# Patient Record
Sex: MALE | Race: ASIAN | NOT HISPANIC OR LATINO
[De-identification: names, ages, dates, MRNs, and addresses within clinical notes are randomized per-mention and may not be internally consistent; named-entity substitution may affect disease eponyms.]

---

## 2018-06-07 ENCOUNTER — MEDICATION RENEWAL (OUTPATIENT)
Age: 68
End: 2018-06-07

## 2018-06-07 PROBLEM — Z00.00 ENCOUNTER FOR PREVENTIVE HEALTH EXAMINATION: Status: ACTIVE | Noted: 2018-06-07

## 2018-11-21 ENCOUNTER — RX RENEWAL (OUTPATIENT)
Age: 68
End: 2018-11-21

## 2018-12-14 ENCOUNTER — RX RENEWAL (OUTPATIENT)
Age: 68
End: 2018-12-14

## 2019-05-10 ENCOUNTER — APPOINTMENT (OUTPATIENT)
Dept: PAIN MANAGEMENT | Facility: CLINIC | Age: 69
End: 2019-05-10
Payer: MEDICARE

## 2019-05-10 VITALS
DIASTOLIC BLOOD PRESSURE: 80 MMHG | BODY MASS INDEX: 22.43 KG/M2 | SYSTOLIC BLOOD PRESSURE: 162 MMHG | HEIGHT: 68 IN | WEIGHT: 148 LBS

## 2019-05-10 DIAGNOSIS — G47.00 INSOMNIA, UNSPECIFIED: ICD-10-CM

## 2019-05-10 PROCEDURE — 99204 OFFICE O/P NEW MOD 45 MIN: CPT

## 2019-05-10 NOTE — ASSESSMENT
[FreeTextEntry1] : back and leg pain likely secondary to lumbar radiculopathy and discogenic pain vs neuropathic pain secondary to postherpetic neuralgia refractory to conservative treatments, will obtain MRI LS to evaluate for pathology\par \par may consider PT vs intervention pending eval\par \par may consider epidural steroid injection vs lumbar sympathetic nerve block for neuropathic pain\par \par \par The above diagnosis and treatment plan is medically reasonable and necessary based on the patient encounter.\par \par There were no barriers to communication.\par Informed patient that I would be available for any additional questions.\par Patient was instructed to call with any worsening symptoms including severe pain, new numbness/weakness, or changes in the bowel/bladder function. \par \par Instructed patient to maintain pain diary to monitor pain level, mobility, and function.\par \par The referring provider was informed of the above diagnosis and treatment plan.\par

## 2019-05-10 NOTE — HISTORY OF PRESENT ILLNESS
[4] : a current pain level of 4/10 [Paroxysmal] : paroxysmal [___ yrs] : [unfilled] year(s) ago [5] : an average pain level of 5/10 [8] : a maximum pain level of 8/10 [6] : a minimum pain level of 6/10 [Burning] : burning [FreeTextEntry1] : HPI\par \par Mr. JUSTYN MORALES is a 68 year M otherwise pmhx of shingles in right right foot 2 years ago complains of right foot and right lateral leg pain.  \par \par \par Previous and current pain medications/doses/effects:\par \par gabapentin 600mg TID without improvement\par Many topical medications\par \par Previous Pain Treatments:\par \par n.a\par \par Previous Pain Injections:\par \par n/a\par \par Previous Diagnostic Studies/Images:\par \par n/a\par \par \par \par  [FreeTextEntry7] : right posterolateral thigh and shin [de-identified] : numbness, pins/needles [FreeTextEntry4] : n/a [FreeTextEntry3] : n/a

## 2019-05-10 NOTE — PHYSICAL EXAM
[UE/LE] : Motor: 5/5 motor strength in bilateral upper & lower extremities [ALL] : dorsalis pedis, posterior tibial, femoral, popliteal, and radial 2+ and symmetric bilaterally [Cranial Nerves Oculomotor (III)] : extraocular motion intact [Cranial Nerves Trigeminal (V)] : facial sensation intact symmetrically [Cranial Nerves Facial (VII)] : face symmetrical [Cranial Nerves Optic (II)] : visual acuity intact bilaterally,  visual fields full to confrontation, pupils equal round and reactive to light [Cranial Nerves Accessory (XI - Cranial And Spinal)] : head turning and shoulder shrug symmetric [Cranial Nerves Glossopharyngeal (IX)] : tongue and palate midline [Cranial Nerves Vestibulocochlear (VIII)] : hearing was intact bilaterally [Cranial Nerves Hypoglossal (XII)] : there was no tongue deviation with protrusion [Within functional limits and without pain] : within functional limits and without pain [Normal] : Gait: normal [de-identified] : discoloration over right ventral feet

## 2019-05-10 NOTE — REASON FOR VISIT
[Initial Consultation] : an initial pain management consultation [FreeTextEntry2] : referred by Anayeli for evaluation of back pain

## 2019-06-07 ENCOUNTER — RESULT REVIEW (OUTPATIENT)
Age: 69
End: 2019-06-07

## 2019-06-10 ENCOUNTER — RECORD ABSTRACTING (OUTPATIENT)
Age: 69
End: 2019-06-10

## 2019-06-10 DIAGNOSIS — Z80.0 FAMILY HISTORY OF MALIGNANT NEOPLASM OF DIGESTIVE ORGANS: ICD-10-CM

## 2019-06-17 ENCOUNTER — RX RENEWAL (OUTPATIENT)
Age: 69
End: 2019-06-17

## 2019-06-18 ENCOUNTER — APPOINTMENT (OUTPATIENT)
Dept: PAIN MANAGEMENT | Facility: CLINIC | Age: 69
End: 2019-06-18
Payer: MEDICARE

## 2019-06-18 VITALS
WEIGHT: 146 LBS | HEIGHT: 68 IN | DIASTOLIC BLOOD PRESSURE: 78 MMHG | SYSTOLIC BLOOD PRESSURE: 146 MMHG | BODY MASS INDEX: 22.13 KG/M2

## 2019-06-18 PROCEDURE — 99214 OFFICE O/P EST MOD 30 MIN: CPT

## 2019-06-18 NOTE — HISTORY OF PRESENT ILLNESS
[___ yrs] : [unfilled] year(s) ago [Paroxysmal] : paroxysmal [4] : a current pain level of 4/10 [5] : an average pain level of 5/10 [6] : a minimum pain level of 6/10 [8] : a maximum pain level of 8/10 [Burning] : burning [FreeTextEntry1] : Interval Note:\par \par Since last visit the pain is not improved. Continues to have significant pain over bottom of right foot and leg.  States that his skin feels burning.  Unable to ambulate.  Denies any additional weakness, numbness, bowel/bladder dysfunction.  Pain intensity is 6/10\par \par \par HPI\par \par Mr. JUSTYN MORALES is a 68 year M otherwise pmhx of shingles in right right foot 2 years ago complains of right foot and right lateral leg pain.  \par \par \par Previous and current pain medications/doses/effects:\par \par gabapentin 600mg TID without improvement\par \par Many topical medications\par \par Previous Pain Treatments:\par \par n.a\par \par Previous Pain Injections:\par \par \par Previous Diagnostic Studies/Images:\par \par MRI LS 6/2019\par \par L5-S1: Mild facet osteoarthritis. Mild disc thinning. Mild disc bulging. Minimal degenerative\par retrolisthesis. Superimposed on disc bulging is a small central/right disc herniation (image 3\par series 11) with compression of the right S1 in the subarticular zone. The thecal sac is markedly\par narrowed predominantly due to epidural lipomatosis. Mild bilateral foramen stenosis.\par \par      L4-L5: Mild facet osteoarthritis. Thickening of the ligamentum flavum. Mild disc bulge.\par Moderate central spinal stenosis predominantly due to epidural lipomatosis. Mild foramen stenosis.\par \par      L3-L4: Mild facet osteoarthritis. Mild disc bulge. Mild central spinal stenosis predominantly\par due to epidural lipomatosis.\par \par      L2-L3: Minimal disc bulge.\par \par      L1-L2: Mild facet osteoarthritis. No bulge or herniation.\par \par      T12-L1: No bulge or herniation. Right facet osteoarthritis results in slight compression of the\par dorsal/right aspect of the thecal sac.\par \par      T11-T12: No bulge or herniation.\par \par  INTRADURAL SPACE AND CONUS MEDULLARIS: No lesion demonstrated.\par \par  BONE MARROW SIGNAL: No neoplastic replacement of bone marrow.\par \par  ALIGNMENT: Straightening of the lumbar lordosis consistent with multilevel degenerative changes,\par positioning and/or muscle spasm.\par \par  PARASPINAL SOFT TISSUES: No paraspinal mass. There is a small right renal lesion that is not fully\par characterized on lumbar MRI, but is consistent with a cyst.\par \par \par \par  IMPRESSION: Multilevel disc bulging and facet osteoarthritis. Epidural lipomatosis.\par \par  Small central/right disc herniation L5-S1 compression of the right S1 nerve root in the\par subarticular zone. Markedly narrowed thecal sac L5-S1 predominantly due to epidural lipomatosis.\par \par  Moderate central spinal stenosis L4-L5 predominantly due to epidural lipomatosis.\par \par  Mild central spinal stenosis L3-L4 predominantly due to epidural lipomatosis.\par \par \par \par \par  [FreeTextEntry7] : right posterolateral thigh and shin [de-identified] : numbness, pins/needles [FreeTextEntry3] : n/a [FreeTextEntry4] : n/a

## 2019-06-18 NOTE — PHYSICAL EXAM
[Within functional limits and without pain] : within functional limits and without pain [UE/LE] : Sensory: Intact in bilateral upper & lower extremities [ALL] : dorsalis pedis, posterior tibial, femoral, popliteal, and radial 2+ and symmetric bilaterally [Cranial Nerves Optic (II)] : visual acuity intact bilaterally,  visual fields full to confrontation, pupils equal round and reactive to light [Cranial Nerves Oculomotor (III)] : extraocular motion intact [Cranial Nerves Trigeminal (V)] : facial sensation intact symmetrically [Cranial Nerves Facial (VII)] : face symmetrical [Cranial Nerves Vestibulocochlear (VIII)] : hearing was intact bilaterally [Cranial Nerves Glossopharyngeal (IX)] : tongue and palate midline [Cranial Nerves Accessory (XI - Cranial And Spinal)] : head turning and shoulder shrug symmetric [Cranial Nerves Hypoglossal (XII)] : there was no tongue deviation with protrusion [Normal] : Normal affect [de-identified] : discoloration over right ventral feet

## 2019-06-18 NOTE — ASSESSMENT
[FreeTextEntry1] : I personally reviewed the relevant imaging.  Discussed and explained to patient the likely source of pathology and pain.  Questions answered.\par \par Lumbar radiculopathy secondary to disc herniation and facet arthropathy demonstrated on MRI LS, refractory to conservative treatments, will schedule RIGHT L5-S1 and S1 transforaminal epidural LOCAL ANESTHETIC injection r/b/a discussed (will avoid steroids currently given increased epidural fat)\par \par trial gabapentin uptitrate to TID\par cautioned change in mood.  Encouraged to call with any worsening mood or depression/suicidal ideations\par \par may consider lumbar sympathetic nerve block for neuropathic pain\par \par Referral for acupuncture treatment\par \par The above diagnosis and treatment plan is medically reasonable and necessary based on the patient encounter.\par \par There were no barriers to communication.\par Informed patient that I would be available for any additional questions.\par Patient was instructed to call with any worsening symptoms including severe pain, new numbness/weakness, or changes in the bowel/bladder function. \par \par Instructed patient to maintain pain diary to monitor pain level, mobility, and function.\par \par

## 2019-07-02 ENCOUNTER — APPOINTMENT (OUTPATIENT)
Dept: GASTROENTEROLOGY | Facility: CLINIC | Age: 69
End: 2019-07-02
Payer: MEDICARE

## 2019-07-02 VITALS
BODY MASS INDEX: 21.98 KG/M2 | SYSTOLIC BLOOD PRESSURE: 172 MMHG | DIASTOLIC BLOOD PRESSURE: 88 MMHG | HEIGHT: 68 IN | HEART RATE: 71 BPM | WEIGHT: 145 LBS

## 2019-07-02 DIAGNOSIS — R91.1 SOLITARY PULMONARY NODULE: ICD-10-CM

## 2019-07-02 PROCEDURE — 99215 OFFICE O/P EST HI 40 MIN: CPT

## 2019-07-02 RX ORDER — ASPIRIN ENTERIC COATED TABLETS 81 MG 81 MG/1
81 TABLET, DELAYED RELEASE ORAL
Refills: 0 | Status: DISCONTINUED | COMMUNITY
End: 2019-07-02

## 2019-07-02 NOTE — HISTORY OF PRESENT ILLNESS
[FreeTextEntry1] : 1.  intermittent diarrhea,\par may go to br three or four times, most if not all days.\par \par can no nocturnal bowel movement\par most ly in the morning, after breakfast...\par loose, urgency..needs to go quickly, hard to hold back.\par \par later in the day, it is improved.\par \par no blood in stool\par \par hx of having colonoscopy, dec 2016..and at the time, he had\par some edematous change of the interhaustral folds of the ascending colon\par \par and on egd;\par \par about eight or ten ulcerations in the descending duodenum.\par \par the colonosc was performed into the terminal ileum \par \par bx of colon unrema\par but marked inflamm on duod bx\par \par not using aspirin, had been using enteric coated aspirin, which i felt was the cause of his problem\par \par weight very stable, not losing weight, appetite is good\par \par has seen Dr William for H zoster, and has post herpetic neuralgia in the leg, saw dr Noe, and dr Noe  began him on Gabapentin tid, \par \par had MRI and Pet scan to further evaluate...about two years ago, when evaluating patient I believe for elevation of his CEA.\par \par everything was reported to the patient has been negative.\par \par no use of scotch, perhaps 8-10 oz of wine in a day..\par \par no mucous in bowel movements..\par \par no abdominal pain or cramps....\par \par review of the previous records, inc in addition to work up in dec 2016\par \par upper gi bleed , late 2017, hosp, received seven units of packed cells , icu care, egd showed very large duodenal ulcer, treated endoscopically and then with embiollization of gastro duodenal artery\par patient on pantop since that time\par \par also, pulmonary pathology noted on most recent ct scan, and subsequently PET scan for further eval of abnormall...CAT scan\par \par he is now having stable weight\par \par

## 2019-07-02 NOTE — PHYSICAL EXAM
[Abdomen Soft] : soft [FreeTextEntry1] : liver down three to four finger breaths below right costal margin [Internal Hemorrhoid] : no internal hemorrhoids [External Hemorrhoid] : no external hemorrhoids [Occult Blood Positive] : stool was negative for occult blood

## 2019-07-02 NOTE — ASSESSMENT
[FreeTextEntry1] : 1.  patient is having abnormal bowel function, loose bowel movements\par 2.  on exam, he also has hepatomegaly\par 3.  ultrasound in march; inc echogenicity\par 4.  i am getting some lab work up, and also, I am urging the patient to see his PCP..he has seen dr Corona, but will be going back to Dr Ramsey\par 5.  in the meantime, kindra do chest and abdominal ct scan\par \par patient needs full eval of abdomen and chest\par o allergy to iodine\par no aller gy to xray contrast\par no asthma\par \par More than 50% of the face to face time was devoted to counseling and /or coordination of care.  THis coordination of care may have included reviewing other medical notes and reports, and communicating with other health professionals\par \par no allergies to meds\par so we will do with iv contrast if his renal function is ok

## 2019-07-04 ENCOUNTER — RX RENEWAL (OUTPATIENT)
Age: 69
End: 2019-07-04

## 2019-07-04 LAB
ALBUMIN SERPL ELPH-MCNC: 4.6 G/DL
ALP BLD-CCNC: 119 U/L
ALT SERPL-CCNC: 33 U/L
ANION GAP SERPL CALC-SCNC: 15 MMOL/L
AST SERPL-CCNC: 88 U/L
BASOPHILS # BLD AUTO: 0.04 K/UL
BASOPHILS NFR BLD AUTO: 1 %
BILIRUB SERPL-MCNC: 1.4 MG/DL
BUN SERPL-MCNC: 15 MG/DL
CALCIUM SERPL-MCNC: 10.1 MG/DL
CEA SERPL-MCNC: 8.9 NG/ML
CHLORIDE SERPL-SCNC: 99 MMOL/L
CO2 SERPL-SCNC: 27 MMOL/L
CREAT SERPL-MCNC: 1.25 MG/DL
CRP SERPL-MCNC: 0.11 MG/DL
EOSINOPHIL # BLD AUTO: 0.13 K/UL
EOSINOPHIL NFR BLD AUTO: 3.1 %
ERYTHROCYTE [SEDIMENTATION RATE] IN BLOOD BY WESTERGREN METHOD: 50 MM/HR
GLUCOSE SERPL-MCNC: 113 MG/DL
HCT VFR BLD CALC: 41.1 %
HGB BLD-MCNC: 13.1 G/DL
IMM GRANULOCYTES NFR BLD AUTO: 0.2 %
LYMPHOCYTES # BLD AUTO: 1.12 K/UL
LYMPHOCYTES NFR BLD AUTO: 26.8 %
MAN DIFF?: NORMAL
MCHC RBC-ENTMCNC: 31.9 GM/DL
MCHC RBC-ENTMCNC: 34.6 PG
MCV RBC AUTO: 108.4 FL
MONOCYTES # BLD AUTO: 0.6 K/UL
MONOCYTES NFR BLD AUTO: 14.4 %
NEUTROPHILS # BLD AUTO: 2.28 K/UL
NEUTROPHILS NFR BLD AUTO: 54.5 %
PLATELET # BLD AUTO: 157 K/UL
POTASSIUM SERPL-SCNC: 5 MMOL/L
PROT SERPL-MCNC: 8.3 G/DL
RBC # BLD: 3.79 M/UL
RBC # FLD: 15.4 %
SODIUM SERPL-SCNC: 141 MMOL/L
WBC # FLD AUTO: 4.18 K/UL

## 2019-07-06 ENCOUNTER — RX RENEWAL (OUTPATIENT)
Age: 69
End: 2019-07-06

## 2019-07-11 ENCOUNTER — APPOINTMENT (OUTPATIENT)
Dept: PAIN MANAGEMENT | Facility: HOSPITAL | Age: 69
End: 2019-07-11

## 2019-08-12 ENCOUNTER — RX RENEWAL (OUTPATIENT)
Age: 69
End: 2019-08-12

## 2020-06-12 ENCOUNTER — APPOINTMENT (OUTPATIENT)
Dept: INTERNAL MEDICINE | Facility: CLINIC | Age: 70
End: 2020-06-12
Payer: MEDICARE

## 2020-06-12 VITALS
BODY MASS INDEX: 23.49 KG/M2 | WEIGHT: 155 LBS | DIASTOLIC BLOOD PRESSURE: 80 MMHG | SYSTOLIC BLOOD PRESSURE: 160 MMHG | HEIGHT: 68 IN | HEART RATE: 97 BPM | OXYGEN SATURATION: 98 %

## 2020-06-12 DIAGNOSIS — M10.9 GOUT, UNSPECIFIED: ICD-10-CM

## 2020-06-12 PROCEDURE — 36415 COLL VENOUS BLD VENIPUNCTURE: CPT

## 2020-06-12 PROCEDURE — 99215 OFFICE O/P EST HI 40 MIN: CPT | Mod: 25

## 2020-06-12 NOTE — HISTORY OF PRESENT ILLNESS
[FreeTextEntry1] : Evaluation for swollen ankles [de-identified] : This is a 69-year-old male that I formally took care of. He has a long-standing history of alcohol abuse and smoking history. He continues to drink about 12 ounces of wine per day and smokes less than one pack per day. He is known to have marked hepatomegaly. He states he had shingles about 3 years ago on the right leg. Since that time he complains of chest pain and numbness in the bottom of his right foot. Recently he also has some pain along the bottom of his left foot. Last year he was in [place] and was told of elevated uric acid levels. He now complains of marked swelling of the ankles right greater than left for the past one to 2 weeks. There is no chest pain no shortness of breath. There is occasional dizziness. His medications include losartan 100, Lipitor 10, Avapro Zoloft 40, vitamins and iron. Current blood pressure is 140/60.

## 2020-06-12 NOTE — PHYSICAL EXAM
[No Acute Distress] : no acute distress [Well Nourished] : well nourished [Well Developed] : well developed [Well-Appearing] : well-appearing [Normal Sclera/Conjunctiva] : normal sclera/conjunctiva [EOMI] : extraocular movements intact [PERRL] : pupils equal round and reactive to light [Normal Oropharynx] : the oropharynx was normal [Normal Outer Ear/Nose] : the outer ears and nose were normal in appearance [No JVD] : no jugular venous distention [No Lymphadenopathy] : no lymphadenopathy [Supple] : supple [Thyroid Normal, No Nodules] : the thyroid was normal and there were no nodules present [No Respiratory Distress] : no respiratory distress  [No Accessory Muscle Use] : no accessory muscle use [Clear to Auscultation] : lungs were clear to auscultation bilaterally [Normal Rate] : normal rate  [Regular Rhythm] : with a regular rhythm [No Murmur] : no murmur heard [Normal S1, S2] : normal S1 and S2 [No Abdominal Bruit] : a ~M bruit was not heard ~T in the abdomen [No Carotid Bruits] : no carotid bruits [No Varicosities] : no varicosities [Pedal Pulses Present] : the pedal pulses are present [No Palpable Aorta] : no palpable aorta [No Extremity Clubbing/Cyanosis] : no extremity clubbing/cyanosis [Soft] : abdomen soft [Non Tender] : non-tender [No Masses] : no abdominal mass palpated [Non-distended] : non-distended [Normal Bowel Sounds] : normal bowel sounds [Normal Posterior Cervical Nodes] : no posterior cervical lymphadenopathy [Normal Anterior Cervical Nodes] : no anterior cervical lymphadenopathy [No CVA Tenderness] : no CVA  tenderness [No Joint Swelling] : no joint swelling [No Spinal Tenderness] : no spinal tenderness [Grossly Normal Strength/Tone] : grossly normal strength/tone [No Rash] : no rash [Coordination Grossly Intact] : coordination grossly intact [Normal Gait] : normal gait [No Focal Deficits] : no focal deficits [Normal Insight/Judgement] : insight and judgment were intact [Normal Affect] : the affect was normal [Deep Tendon Reflexes (DTR)] : deep tendon reflexes were 2+ and symmetric [de-identified] : 3+ ankle edema r >l  [de-identified] : liver 4 fb below rcm

## 2020-06-12 NOTE — ASSESSMENT
[FreeTextEntry1] : Patient with marked ankle edema bilaterally. I will start Lasix 20 mg daily. We'll obtain routine labs including uric acid level although I do not believe we are dealing with gout.. Patient clearly has chronic liver disease. He is advised that he must stop drinking. 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 Patient is advised to see a neurologist.

## 2020-06-17 LAB
25(OH)D3 SERPL-MCNC: 51.9 NG/ML
ALBUMIN SERPL ELPH-MCNC: 4.2 G/DL
ALP BLD-CCNC: 138 U/L
ALT SERPL-CCNC: 31 U/L
ANION GAP SERPL CALC-SCNC: 18 MMOL/L
AST SERPL-CCNC: 98 U/L
BASOPHILS # BLD AUTO: 0.04 K/UL
BASOPHILS NFR BLD AUTO: 0.7 %
BILIRUB SERPL-MCNC: 1.5 MG/DL
BUN SERPL-MCNC: 12 MG/DL
CALCIUM SERPL-MCNC: 9.8 MG/DL
CHLORIDE SERPL-SCNC: 98 MMOL/L
CHOLEST SERPL-MCNC: 146 MG/DL
CHOLEST/HDLC SERPL: 4.5 RATIO
CO2 SERPL-SCNC: 22 MMOL/L
CREAT SERPL-MCNC: 1.16 MG/DL
EOSINOPHIL # BLD AUTO: 0.09 K/UL
EOSINOPHIL NFR BLD AUTO: 1.6 %
ESTIMATED AVERAGE GLUCOSE: 108 MG/DL
GLUCOSE SERPL-MCNC: 149 MG/DL
HBA1C MFR BLD HPLC: 5.4 %
HCT VFR BLD CALC: 39 %
HDLC SERPL-MCNC: 32 MG/DL
HGB BLD-MCNC: 12.4 G/DL
IMM GRANULOCYTES NFR BLD AUTO: 0.2 %
LDLC SERPL CALC-MCNC: 58 MG/DL
LYMPHOCYTES # BLD AUTO: 1.45 K/UL
LYMPHOCYTES NFR BLD AUTO: 26.5 %
MAN DIFF?: NORMAL
MCHC RBC-ENTMCNC: 31.6 PG
MCHC RBC-ENTMCNC: 31.8 GM/DL
MCV RBC AUTO: 99.5 FL
MONOCYTES # BLD AUTO: 0.69 K/UL
MONOCYTES NFR BLD AUTO: 12.6 %
NEUTROPHILS # BLD AUTO: 3.19 K/UL
NEUTROPHILS NFR BLD AUTO: 58.4 %
PLATELET # BLD AUTO: 148 K/UL
POTASSIUM SERPL-SCNC: 4 MMOL/L
PROT SERPL-MCNC: 8 G/DL
PSA SERPL-MCNC: 0.57 NG/ML
RBC # BLD: 3.92 M/UL
RBC # FLD: 16.5 %
SODIUM SERPL-SCNC: 138 MMOL/L
TRIGL SERPL-MCNC: 279 MG/DL
TSH SERPL-ACNC: 2.71 UIU/ML
URATE SERPL-MCNC: 7.4 MG/DL
WBC # FLD AUTO: 5.47 K/UL

## 2020-08-10 ENCOUNTER — RX RENEWAL (OUTPATIENT)
Age: 70
End: 2020-08-10

## 2020-11-22 DIAGNOSIS — R01.1 CARDIAC MURMUR, UNSPECIFIED: ICD-10-CM

## 2020-12-15 ENCOUNTER — NON-APPOINTMENT (OUTPATIENT)
Age: 70
End: 2020-12-15

## 2020-12-15 ENCOUNTER — APPOINTMENT (OUTPATIENT)
Dept: CARDIOLOGY | Facility: CLINIC | Age: 70
End: 2020-12-15
Payer: MEDICARE

## 2020-12-15 VITALS
DIASTOLIC BLOOD PRESSURE: 70 MMHG | WEIGHT: 155 LBS | HEIGHT: 68 IN | HEART RATE: 100 BPM | BODY MASS INDEX: 23.49 KG/M2 | SYSTOLIC BLOOD PRESSURE: 130 MMHG

## 2020-12-15 PROBLEM — R01.1 MURMUR: Status: ACTIVE | Noted: 2020-12-14

## 2020-12-15 PROCEDURE — 99205 OFFICE O/P NEW HI 60 MIN: CPT

## 2020-12-15 PROCEDURE — 0296T: CPT

## 2020-12-15 PROCEDURE — 99072 ADDL SUPL MATRL&STAF TM PHE: CPT

## 2020-12-15 PROCEDURE — 93000 ELECTROCARDIOGRAM COMPLETE: CPT | Mod: 59

## 2020-12-15 RX ORDER — FUROSEMIDE 20 MG/1
20 TABLET ORAL DAILY
Qty: 90 | Refills: 3 | Status: DISCONTINUED | COMMUNITY
Start: 2020-06-17 | End: 2020-12-15

## 2020-12-15 RX ORDER — PANTOPRAZOLE 40 MG/1
40 TABLET, DELAYED RELEASE ORAL DAILY
Qty: 90 | Refills: 3 | Status: DISCONTINUED | COMMUNITY
Start: 2019-07-06 | End: 2020-12-15

## 2020-12-15 RX ORDER — VALACYCLOVIR 1 G/1
1 TABLET, FILM COATED ORAL
Refills: 0 | Status: DISCONTINUED | COMMUNITY
End: 2020-12-15

## 2020-12-15 RX ORDER — ATORVASTATIN CALCIUM 10 MG/1
10 TABLET, FILM COATED ORAL DAILY
Qty: 90 | Refills: 3 | Status: DISCONTINUED | COMMUNITY
Start: 2019-06-17 | End: 2020-12-15

## 2020-12-15 RX ORDER — ZOLPIDEM TARTRATE 5 MG/1
5 TABLET, FILM COATED ORAL
Qty: 30 | Refills: 0 | Status: DISCONTINUED | COMMUNITY
End: 2020-12-15

## 2020-12-15 RX ORDER — PANTOPRAZOLE 40 MG/1
40 TABLET, DELAYED RELEASE ORAL DAILY
Qty: 90 | Refills: 3 | Status: DISCONTINUED | COMMUNITY
Start: 2019-07-04 | End: 2020-12-15

## 2020-12-15 RX ORDER — PANTOPRAZOLE 40 MG/1
40 TABLET, DELAYED RELEASE ORAL
Qty: 90 | Refills: 3 | Status: DISCONTINUED | COMMUNITY
Start: 2018-12-14 | End: 2020-12-15

## 2020-12-15 RX ORDER — GABAPENTIN 300 MG/1
300 CAPSULE ORAL
Qty: 90 | Refills: 1 | Status: DISCONTINUED | COMMUNITY
Start: 2019-06-18 | End: 2020-12-15

## 2020-12-21 PROCEDURE — 99072 ADDL SUPL MATRL&STAF TM PHE: CPT

## 2020-12-21 PROCEDURE — 0298T: CPT

## 2021-01-12 ENCOUNTER — RESULT REVIEW (OUTPATIENT)
Age: 71
End: 2021-01-12

## 2021-01-26 ENCOUNTER — APPOINTMENT (OUTPATIENT)
Dept: CARDIOLOGY | Facility: CLINIC | Age: 71
End: 2021-01-26
Payer: MEDICARE

## 2021-01-26 VITALS
WEIGHT: 169 LBS | TEMPERATURE: 97.8 F | BODY MASS INDEX: 25.61 KG/M2 | DIASTOLIC BLOOD PRESSURE: 50 MMHG | HEART RATE: 76 BPM | SYSTOLIC BLOOD PRESSURE: 110 MMHG | HEIGHT: 68 IN

## 2021-01-26 DIAGNOSIS — R00.2 PALPITATIONS: ICD-10-CM

## 2021-01-26 DIAGNOSIS — I47.1 SUPRAVENTRICULAR TACHYCARDIA: ICD-10-CM

## 2021-01-26 PROCEDURE — 99072 ADDL SUPL MATRL&STAF TM PHE: CPT

## 2021-01-26 PROCEDURE — 99215 OFFICE O/P EST HI 40 MIN: CPT

## 2021-01-28 ENCOUNTER — RESULT REVIEW (OUTPATIENT)
Age: 71
End: 2021-01-28

## 2021-01-29 ENCOUNTER — APPOINTMENT (OUTPATIENT)
Dept: GASTROENTEROLOGY | Facility: HOSPITAL | Age: 71
End: 2021-01-29

## 2021-01-29 ENCOUNTER — RESULT REVIEW (OUTPATIENT)
Age: 71
End: 2021-01-29

## 2021-02-02 ENCOUNTER — APPOINTMENT (OUTPATIENT)
Dept: GASTROENTEROLOGY | Facility: HOSPITAL | Age: 71
End: 2021-02-02

## 2021-02-06 ENCOUNTER — RESULT REVIEW (OUTPATIENT)
Age: 71
End: 2021-02-06

## 2021-02-18 ENCOUNTER — RX CHANGE (OUTPATIENT)
Age: 71
End: 2021-02-18

## 2021-03-30 ENCOUNTER — APPOINTMENT (OUTPATIENT)
Dept: GASTROENTEROLOGY | Facility: CLINIC | Age: 71
End: 2021-03-30
Payer: MEDICARE

## 2021-03-30 VITALS
TEMPERATURE: 97.6 F | WEIGHT: 140 LBS | HEART RATE: 65 BPM | DIASTOLIC BLOOD PRESSURE: 76 MMHG | BODY MASS INDEX: 21.22 KG/M2 | SYSTOLIC BLOOD PRESSURE: 122 MMHG | HEIGHT: 68 IN

## 2021-03-30 VITALS
TEMPERATURE: 99 F | WEIGHT: 140 LBS | DIASTOLIC BLOOD PRESSURE: 76 MMHG | HEART RATE: 65 BPM | HEIGHT: 68 IN | BODY MASS INDEX: 21.22 KG/M2 | SYSTOLIC BLOOD PRESSURE: 128 MMHG

## 2021-03-30 DIAGNOSIS — R97.0 ELEVATED CARCINOEMBRYONIC ANTIGEN [CEA]: ICD-10-CM

## 2021-03-30 PROCEDURE — 99214 OFFICE O/P EST MOD 30 MIN: CPT

## 2021-03-30 PROCEDURE — 99072 ADDL SUPL MATRL&STAF TM PHE: CPT

## 2021-03-30 RX ORDER — MULTIVITAMIN WITH FOLIC ACID 400 MCG
TABLET ORAL
Qty: 30 | Refills: 0 | Status: DISCONTINUED | COMMUNITY
Start: 2021-02-13

## 2021-03-30 RX ORDER — FUROSEMIDE 40 MG/1
40 TABLET ORAL
Qty: 15 | Refills: 0 | Status: DISCONTINUED | COMMUNITY
Start: 2021-02-13

## 2021-03-30 NOTE — ASSESSMENT
[FreeTextEntry1] : 1.  patient has multiple comorbidities\par \par 2.  smoker, had pneumonia in january\par \par 3.  had presented with a gi bleed\par \par 4  no one sure about his medications, son asked the patient..\par \par 5.  patient is failing, somewhat sob, still smokes, has quit drinking perhaps six weeks ago\par \par 6.  patient not sure whether he still takes protonix\par \par \par planp;\par this patient has jessika many issues, and uncertainties about his status and care;\par i see no alternative but to send to ED\par for ct of chest and abdomen\par abg\par labs\par \par More than 50% of the face to face time was devoted to counseling and /or coordination of care.  THis coordination of care may have included reviewing other medical notes and reports, and communicating with other health professionals\par \par

## 2021-03-30 NOTE — HISTORY OF PRESENT ILLNESS
[FreeTextEntry1] : this is a [age] year old gentleman, chinese, with some limited English, who is here with his son because of epigastric pain of two weeks duration.  he was hospitalized with gi bleed, also multi focal pneumonia, back in january\par dr biswas did colonosocpy; negative\par egd; GAVE syndrome suggested\par \par patient was discharged, but also had ct showing multifocal pneumonia\par \par patient has had unclear follow up since\par \par he contacted me just last nightfor visit\par \par i had him come in today\par \par he is complaining of nonspecific epigastric pain of two weeks duration\par \par weight unsure\par appetite seems to be preserved\par \par breathing is somewhat labored at times

## 2021-03-30 NOTE — PHYSICAL EXAM
[Bowel Sounds] : normal bowel sounds [Abdomen Soft] : soft [Abdomen Tenderness] : non-tender [] : no hepato-splenomegaly [Abdomen Mass (___ Cm)] : no abdominal mass palpated [FreeTextEntry1] : no stigmatqaof cirrhosis..h [Normal Sphincter Tone] : normal sphincter tone [Internal Hemorrhoid] : no internal hemorrhoids [External Hemorrhoid] : no external hemorrhoids [Occult Blood Positive] : stool was negative for occult blood

## 2021-04-02 RX ORDER — OMEPRAZOLE 20 MG/1
20 CAPSULE, DELAYED RELEASE ORAL
Qty: 30 | Refills: 5 | Status: ACTIVE | COMMUNITY
Start: 2021-04-02 | End: 1900-01-01

## 2021-04-05 ENCOUNTER — RESULT REVIEW (OUTPATIENT)
Age: 71
End: 2021-04-05

## 2021-04-07 ENCOUNTER — RESULT REVIEW (OUTPATIENT)
Age: 71
End: 2021-04-07

## 2021-04-08 ENCOUNTER — APPOINTMENT (OUTPATIENT)
Dept: GASTROENTEROLOGY | Facility: HOSPITAL | Age: 71
End: 2021-04-08

## 2021-05-03 ENCOUNTER — RESULT REVIEW (OUTPATIENT)
Age: 71
End: 2021-05-03

## 2021-10-27 ENCOUNTER — APPOINTMENT (OUTPATIENT)
Dept: NEPHROLOGY | Facility: CLINIC | Age: 71
End: 2021-10-27

## 2021-10-28 ENCOUNTER — APPOINTMENT (OUTPATIENT)
Dept: GASTROENTEROLOGY | Facility: CLINIC | Age: 71
End: 2021-10-28
Payer: MEDICARE

## 2021-10-28 DIAGNOSIS — K31.1 ADULT HYPERTROPHIC PYLORIC STENOSIS: ICD-10-CM

## 2021-10-28 PROCEDURE — 99443: CPT

## 2021-10-28 NOTE — HISTORY OF PRESENT ILLNESS
[FreeTextEntry1] : telephonic visit\par \par consent on file\par \par audio only\par \par i am in my office\par \par had recent ct scan\par \par feels swollen\par \par and he had a cat scan of the abdomen\par \par which showed pyloric narrowing, thickening of the wall\par and a endoscopy was advised\par \par patient did in fact have egd early april\par \par he is currently on omeprazole

## 2021-10-28 NOTE — ASSESSMENT
[FreeTextEntry1] : patient has actually had an egd in april, just six to seven mos ago\par and there was nothing to suggest neoplasm at the time\par \par 2.  he has undergone a major life saving vascular procedure, aortic reconstructive surgery, for a very large thoracic aortic aneurysm\par \par 3.  i would advise that instea of putting Mervin through more endoscopic and interventional procedures, that we just do an upper gi series with air, and i feel that we will adequately delineate any apathology in the pylorus, or distal stomach\par \par if there is any doubt to follow, we will do an egd\par \par he also had CIrrhosis.\par \par

## 2021-11-08 ENCOUNTER — RESULT REVIEW (OUTPATIENT)
Age: 71
End: 2021-11-08

## 2021-11-17 ENCOUNTER — RESULT REVIEW (OUTPATIENT)
Age: 71
End: 2021-11-17

## 2021-11-17 ENCOUNTER — APPOINTMENT (OUTPATIENT)
Dept: NEPHROLOGY | Facility: CLINIC | Age: 71
End: 2021-11-17
Payer: MEDICARE

## 2021-11-17 VITALS
TEMPERATURE: 97.6 F | WEIGHT: 150 LBS | SYSTOLIC BLOOD PRESSURE: 132 MMHG | HEART RATE: 75 BPM | OXYGEN SATURATION: 95 % | DIASTOLIC BLOOD PRESSURE: 64 MMHG | HEIGHT: 68 IN | BODY MASS INDEX: 22.73 KG/M2

## 2021-11-17 PROCEDURE — 99205 OFFICE O/P NEW HI 60 MIN: CPT

## 2021-11-17 PROCEDURE — 99215 OFFICE O/P EST HI 40 MIN: CPT

## 2021-11-17 RX ORDER — ATORVASTATIN CALCIUM 10 MG/1
10 TABLET, FILM COATED ORAL DAILY
Qty: 90 | Refills: 3 | Status: DISCONTINUED | COMMUNITY
Start: 2020-07-20 | End: 2021-11-17

## 2021-11-17 RX ORDER — NADOLOL 40 MG/1
40 TABLET ORAL DAILY
Qty: 90 | Refills: 3 | Status: DISCONTINUED | COMMUNITY
Start: 2021-01-26 | End: 2021-11-17

## 2021-11-17 RX ORDER — PANTOPRAZOLE SODIUM 40 MG/1
40 TABLET, DELAYED RELEASE ORAL
Refills: 0 | Status: DISCONTINUED | COMMUNITY
End: 2021-11-17

## 2021-11-17 RX ORDER — LOSARTAN POTASSIUM 50 MG/1
50 TABLET, FILM COATED ORAL
Qty: 30 | Refills: 3 | Status: DISCONTINUED | COMMUNITY
Start: 2018-11-21 | End: 2021-11-17

## 2021-11-17 NOTE — ASSESSMENT
[FreeTextEntry1] : FRANCINE, likely prerenal from diuretics in setting of decreased oncotic pressure from low albumin from cirrhosis - vs HRS, diuretics must be ised with caution\par - check UPC, Lu and Ucr\par - check Uric acid level\par - check BMET, Salb, coags\par - check D2 level\par \par Anemia\par -not likely from CKD, suspect from marrow suppression from longstanding ETOH use vs sequestration from cirrhosis ( hypersplenism) though not noeted on MRI in 5/2021\par \par Hyperuricemia\par - Uric  acid 12.2 9/2021\par - recheck, may need to resume allopurinol\par \par Recheck ECHO\par Reviewed MRI\par Reviewed BMET, uric acid and CBC from Dr. Corona\par reviewed GI note from Dr. vasquez\par \par \par f/u wi\par

## 2021-11-17 NOTE — PHYSICAL EXAM
[General Appearance - Alert] : alert [General Appearance - In No Acute Distress] : in no acute distress [Sclera] : the sclera and conjunctiva were normal [Extraocular Movements] : extraocular movements were intact [Outer Ear] : the ears and nose were normal in appearance [Hearing Threshold Finger Rub Not [place]] : hearing was normal [Neck Appearance] : the appearance of the neck was normal [] : no respiratory distress [Exaggerated Use Of Accessory Muscles For Inspiration] : no accessory muscle use [Apical Impulse] : the apical impulse was normal [Heart Sounds] : normal S1 and S2 [Bowel Sounds] : normal bowel sounds [Abdomen Tenderness] : non-tender [Abnormal Walk] : normal gait [Musculoskeletal - Swelling] : no joint swelling seen [Cranial Nerves] : cranial nerves 2-12 were intact [No Focal Deficits] : no focal deficits [Oriented To Time, Place, And Person] : oriented to person, place, and time [Affect] : the affect was normal [Mood] : the mood was normal [FreeTextEntry1] : discolored ke skin

## 2021-11-17 NOTE — HISTORY OF PRESENT ILLNESS
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 longstanding hx of tobacco use and EtOH ( quit a year agao) - recentlyunderwent  massive reconstructive aortic surgery for a thoracic aneurysm ( 5/2021) - cr in 6/2021 was 1, in August repeat cr was 1.6 and by Sep had increased to 1.98 with a dcline in GFR to ~32;\par \par Recalls he was discharged in 6/2021 on lasix 20mg daily  - dose has been adjusted frequently and was increased to 40mg daily, then 40mg alternating with 80mg daily and is now on 80mg daily as of a week ago.\par \par Noted increased le swelling within a month of surgery in 6/2021.\par \par Is followed by liver specialist at [facility] for cirrhosis.\par \par PMH: no CKD

## 2021-11-24 ENCOUNTER — RESULT REVIEW (OUTPATIENT)
Age: 71
End: 2021-11-24

## 2021-11-24 ENCOUNTER — APPOINTMENT (OUTPATIENT)
Dept: NEPHROLOGY | Facility: CLINIC | Age: 71
End: 2021-11-24
Payer: MEDICARE

## 2021-11-24 VITALS — BODY MASS INDEX: 23.72 KG/M2 | WEIGHT: 156 LBS

## 2021-11-24 PROCEDURE — 36415 COLL VENOUS BLD VENIPUNCTURE: CPT

## 2021-12-01 ENCOUNTER — RESULT REVIEW (OUTPATIENT)
Age: 71
End: 2021-12-01

## 2021-12-08 ENCOUNTER — RESULT REVIEW (OUTPATIENT)
Age: 71
End: 2021-12-08

## 2021-12-08 ENCOUNTER — APPOINTMENT (OUTPATIENT)
Dept: NEPHROLOGY | Facility: CLINIC | Age: 71
End: 2021-12-08
Payer: MEDICARE

## 2021-12-08 PROCEDURE — P9615: CPT

## 2021-12-08 PROCEDURE — 36415 COLL VENOUS BLD VENIPUNCTURE: CPT

## 2021-12-21 ENCOUNTER — APPOINTMENT (OUTPATIENT)
Dept: GASTROENTEROLOGY | Facility: CLINIC | Age: 71
End: 2021-12-21
Payer: MEDICARE

## 2021-12-21 PROCEDURE — 99443: CPT

## 2021-12-21 NOTE — HISTORY OF PRESENT ILLNESS
[FreeTextEntry1] : telephonic visit, consent on file, using office phone, [phone], patient at home, i am in the office, just the two of us\par \par patient is doing ok..\par had upper gi series after ct suggested thickening of the antro pyloric area\par and the upper gi appeared normal\par there was no sign of any infiltrative process in the distal stomach, pylorus, or duodenum\par \par still Mervin is complaining of a feeling like a broom in his upper abdomen, feels some fullness, doesn’t feel completely discomfort free..it is somewhat like a bloated feeling.\par \par meanwhile he is doing well with fluid mobilization\par on the regimen that dr King has him on\par including furosemide 20 al with 40 mg\par \par and sprionolactone 25 mgm per day\par \par is ankle edema is gone\par \par plus allopurinol...\par \par

## 2021-12-21 NOTE — ASSESSMENT
[FreeTextEntry1] : 1.  it does appear that Mervin has Cirrhosis\par \par 2.  this would account for the ascites and fluid retention\par \par he is responding to treatment as above\par \par I am asking for him to come to office\par and i will order some hepatitis studies which we have not done, for hep b and hep c\par \par return in mid january for above\par \par More than 50% of the face to face time was devoted to counseling and /or coordination of care.  THis coordination of care may have included reviewing other medical notes and reports, and communicating with other health professionals\par

## 2022-01-12 ENCOUNTER — LABORATORY RESULT (OUTPATIENT)
Age: 72
End: 2022-01-12

## 2022-01-12 ENCOUNTER — APPOINTMENT (OUTPATIENT)
Dept: GASTROENTEROLOGY | Facility: CLINIC | Age: 72
End: 2022-01-12
Payer: MEDICARE

## 2022-01-12 VITALS
HEART RATE: 74 BPM | OXYGEN SATURATION: 97 % | BODY MASS INDEX: 23.64 KG/M2 | DIASTOLIC BLOOD PRESSURE: 60 MMHG | HEIGHT: 68 IN | WEIGHT: 156 LBS | TEMPERATURE: 97.5 F | SYSTOLIC BLOOD PRESSURE: 110 MMHG

## 2022-01-12 PROCEDURE — 99214 OFFICE O/P EST MOD 30 MIN: CPT

## 2022-01-12 NOTE — PHYSICAL EXAM
[Abdomen Tenderness] : non-tender [] : no hepato-splenomegaly [FreeTextEntry1] : abdomen is moderately distended, feels as if there is ascites present, and this is causing his symptoms

## 2022-01-12 NOTE — ASSESSMENT
[FreeTextEntry1] : 1.  status post aortic reconstructive surgery of the descending aorta, with a grat placed, [facility]\par 2.  has developed ascites, moderate in degree, etiology probably due to hepatic disease/ cirrhosis\par 3.  previously we had not diagnosed chronic liver dx\par but we need to check some labs including hepatitis testing, hep c and hep b\par 4.  patient is currently spironolactone 25 mg po bid\par 5.  furosemide 20 mg per day\par 6.  apparently patient may have seen a hepatologist at the medical center.\par \par plan..\par check labs, lytes, hep studies\par arrange abdominal ultrasound for ascities\par continue current meds, plus omeprazole\par note;  upper gi series did not show any narrowing in the gastric antrum

## 2022-01-12 NOTE — HISTORY OF PRESENT ILLNESS
[FreeTextEntry1] : patient continues to experience abdominal discomfort, feels as if it is a swelling in the upper abdomen, and it goes along with his exam, see below, compatible with ascites\par he seemed to respond to diuretics, but now silvana abdomen is again increasing in girth\par \par

## 2022-01-13 ENCOUNTER — RESULT REVIEW (OUTPATIENT)
Age: 72
End: 2022-01-13

## 2022-01-13 ENCOUNTER — APPOINTMENT (OUTPATIENT)
Dept: NEPHROLOGY | Facility: CLINIC | Age: 72
End: 2022-01-13
Payer: MEDICARE

## 2022-01-13 VITALS
DIASTOLIC BLOOD PRESSURE: 50 MMHG | TEMPERATURE: 99.5 F | SYSTOLIC BLOOD PRESSURE: 114 MMHG | OXYGEN SATURATION: 96 % | WEIGHT: 151 LBS | BODY MASS INDEX: 22.88 KG/M2 | HEIGHT: 68 IN | HEART RATE: 80 BPM

## 2022-01-13 DIAGNOSIS — R42 DIZZINESS AND GIDDINESS: ICD-10-CM

## 2022-01-13 PROCEDURE — 99214 OFFICE O/P EST MOD 30 MIN: CPT

## 2022-01-13 NOTE — HISTORY OF PRESENT ILLNESS
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 ( quit a year agao) - recently underwent  massive reconstructive aortic surgery for a thoracic aneurysm (5/2021) - cr in 6/2021 was 1, in August repeat cr was 1.6 and by Sep had increased to 1.98 with a decline in GFR to ~32;\par \par Recalls he was discharged in 6/2021 on lasix 20mg daily  - dose has been adjusted frequently and was increased to 40mg daily, then 40mg alternating with 80mg daily and is now on 80mg daily as of a week ago.\par \par Noted increased le swelling within a month of surgery in 6/2021.\par \par Is followed by liver specialist at [facility] for cirrhosis.\par \par PMH: no CKD\par \par 1/2022\par here for f/u \par lasix reduced from 80 mg daily to alternating doses of 40 and 20, and as of 1/3/2022 has been on 20mg daily - \par virtually no le edema, but dose have ascites\par weight stable ~150\par cr has been ~ 2 \par

## 2022-01-13 NOTE — ASSESSMENT
[FreeTextEntry1] : FRANCINE vs CKD likely prerenal from diuretics in setting of decreased oncotic pressure from low albumin from cirrhosis - vs HRS, diuretics must be used with caution, dose reduced  from 80 to 40/20 and now 20 daily, will try to reduce further 20/10\par - recheck UPC, Lu and Ucr\par - recheck BMET, Salb, coags\par \par Anemia\par -not likely from CKD, suspect from marrow suppression from longstanding ETOH use vs sequestration from cirrhosis ( hypersplenism) though not noted on MRI in 5/2021\par \par Hyperuricemia\par - Uric  acid 12.2 9/2021--> 7.8 on allopurinol, increae to 200mg\par \par Reviewed ECHO\par Reviewed MRI\par Reviewed BMET, uric acid and CBC from Dr. Corona\par reviewed GI note from Dr. vasquez\par \par Post herpetic pain\par - topical diclfenac\par - refer to pool therapy\par \par f/u in 2 months\par

## 2022-01-13 NOTE — PHYSICAL EXAM
[General Appearance - Alert] : alert [General Appearance - In No Acute Distress] : in no acute distress [Sclera] : the sclera and conjunctiva were normal [Extraocular Movements] : extraocular movements were intact [Outer Ear] : the ears and nose were normal in appearance [Hearing Threshold Finger Rub Not [place]] : hearing was normal [Neck Appearance] : the appearance of the neck was normal [] : no respiratory distress [Exaggerated Use Of Accessory Muscles For Inspiration] : no accessory muscle use [Apical Impulse] : the apical impulse was normal [Heart Sounds] : normal S1 and S2 [Bowel Sounds] : normal bowel sounds [Abdomen Tenderness] : non-tender [Abnormal Walk] : normal gait [Musculoskeletal - Swelling] : no joint swelling seen [Cranial Nerves] : cranial nerves 2-12 were intact [No Focal Deficits] : no focal deficits [Oriented To Time, Place, And Person] : oriented to person, place, and time [Affect] : the affect was normal [Mood] : the mood was normal [FreeTextEntry1] : discolored ke skin

## 2022-01-14 ENCOUNTER — RESULT REVIEW (OUTPATIENT)
Age: 72
End: 2022-01-14

## 2022-01-15 LAB
AFP-TM SERPL-MCNC: 3.3 NG/ML
ALBUMIN SERPL ELPH-MCNC: 4 G/DL
ALP BLD-CCNC: 115 U/L
ALT SERPL-CCNC: 25 U/L
ANION GAP SERPL CALC-SCNC: 16 MMOL/L
APTT 2H P 1:4 NP PPP: 34.6 SEC
APTT 2H P INC PPP: 34.9 SEC
APTT IMM NP/PRE NP PPP: 34.6 SEC
APTT INV RATIO PPP: 37.9 SEC
AST SERPL-CCNC: 59 U/L
BASOPHILS # BLD AUTO: 0.02 K/UL
BASOPHILS NFR BLD AUTO: 0.5 %
BILIRUB SERPL-MCNC: 1 MG/DL
BUN SERPL-MCNC: 28 MG/DL
CALCIUM SERPL-MCNC: 9.5 MG/DL
CHLORIDE SERPL-SCNC: 98 MMOL/L
CO2 SERPL-SCNC: 19 MMOL/L
CREAT SERPL-MCNC: 2.08 MG/DL
CRP SERPL-MCNC: 4 MG/L
EOSINOPHIL # BLD AUTO: 0.02 K/UL
EOSINOPHIL NFR BLD AUTO: 0.5 %
GLUCOSE SERPL-MCNC: 97 MG/DL
HBV CORE IGG+IGM SER QL: REACTIVE
HBV SURFACE AB SER QL: REACTIVE
HBV SURFACE AG SER QL: NONREACTIVE
HCT VFR BLD CALC: 37 %
HCV AB SER QL: ABNORMAL
HCV S/CO RATIO: 1.35 S/CO
HGB BLD-MCNC: 12 G/DL
IMM GRANULOCYTES NFR BLD AUTO: 0.2 %
LYMPHOCYTES # BLD AUTO: 1.38 K/UL
LYMPHOCYTES NFR BLD AUTO: 31.4 %
MAN DIFF?: NORMAL
MCHC RBC-ENTMCNC: 28.7 PG
MCHC RBC-ENTMCNC: 32.4 GM/DL
MCV RBC AUTO: 88.5 FL
MONOCYTES # BLD AUTO: 0.89 K/UL
MONOCYTES NFR BLD AUTO: 20.3 %
NEUTROPHILS # BLD AUTO: 2.07 K/UL
NEUTROPHILS NFR BLD AUTO: 47.1 %
NPP NORMAL POOLED PLASMA: 33.9 SECS
PLATELET # BLD AUTO: 199 K/UL
POTASSIUM SERPL-SCNC: 4.2 MMOL/L
PROT SERPL-MCNC: 8.3 G/DL
RBC # BLD: 4.18 M/UL
RBC # FLD: 16.4 %
SODIUM SERPL-SCNC: 132 MMOL/L
WBC # FLD AUTO: 4.39 K/UL

## 2022-01-26 ENCOUNTER — APPOINTMENT (OUTPATIENT)
Dept: GASTROENTEROLOGY | Facility: CLINIC | Age: 72
End: 2022-01-26
Payer: MEDICARE

## 2022-01-26 PROCEDURE — 36415 COLL VENOUS BLD VENIPUNCTURE: CPT

## 2022-01-30 LAB
HCV RNA SERPL NAA+PROBE-LOG IU: NOT DETECTED LOGIU/ML
HEPC RNA INTERP: NOT DETECTED

## 2022-02-02 LAB — HCV GENTYP BLD NAA+PROBE: NOT DETECTED

## 2022-02-24 ENCOUNTER — APPOINTMENT (OUTPATIENT)
Dept: GASTROENTEROLOGY | Facility: CLINIC | Age: 72
End: 2022-02-24
Payer: MEDICARE

## 2022-02-24 VITALS
DIASTOLIC BLOOD PRESSURE: 60 MMHG | SYSTOLIC BLOOD PRESSURE: 108 MMHG | TEMPERATURE: 97.1 F | BODY MASS INDEX: 23.19 KG/M2 | WEIGHT: 153 LBS | HEIGHT: 68 IN | HEART RATE: 64 BPM

## 2022-02-24 DIAGNOSIS — K21.9 GASTRO-ESOPHAGEAL REFLUX DISEASE W/OUT ESOPHAGITIS: ICD-10-CM

## 2022-02-24 PROCEDURE — 99213 OFFICE O/P EST LOW 20 MIN: CPT

## 2022-02-24 NOTE — HISTORY OF PRESENT ILLNESS
[FreeTextEntry1] : in office\par no change in patients condition\par still some vague discomfort\par \par has appt coming up with Hepatology\par \par as for current status, on furosemide at dose as per dr King, as Dr King tries to wean him further\par \par not using alcohol\par not smoking

## 2022-02-24 NOTE — ASSESSMENT
[FreeTextEntry1] : follow up in three months\par doing well\par no changes\par \par will see neurology because of long standing post herpetic pain, will consult with dr rodríguez

## 2022-03-09 ENCOUNTER — RX RENEWAL (OUTPATIENT)
Age: 72
End: 2022-03-09

## 2022-03-10 ENCOUNTER — APPOINTMENT (OUTPATIENT)
Dept: NEPHROLOGY | Facility: CLINIC | Age: 72
End: 2022-03-10
Payer: MEDICARE

## 2022-03-10 ENCOUNTER — RESULT REVIEW (OUTPATIENT)
Age: 72
End: 2022-03-10

## 2022-03-10 VITALS
SYSTOLIC BLOOD PRESSURE: 120 MMHG | HEIGHT: 68 IN | TEMPERATURE: 98.3 F | DIASTOLIC BLOOD PRESSURE: 60 MMHG | WEIGHT: 153 LBS | OXYGEN SATURATION: 99 % | HEART RATE: 77 BPM | BODY MASS INDEX: 23.19 KG/M2

## 2022-03-10 PROCEDURE — 99215 OFFICE O/P EST HI 40 MIN: CPT

## 2022-03-10 NOTE — PHYSICAL EXAM
[General Appearance - Alert] : alert [General Appearance - In No Acute Distress] : in no acute distress [Sclera] : the sclera and conjunctiva were normal [Extraocular Movements] : extraocular movements were intact [Outer Ear] : the ears and nose were normal in appearance [Hearing Threshold Finger Rub Not [place]] : hearing was normal [Neck Appearance] : the appearance of the neck was normal [] : no respiratory distress [Exaggerated Use Of Accessory Muscles For Inspiration] : no accessory muscle use [Apical Impulse] : the apical impulse was normal [Heart Sounds] : normal S1 and S2 [Bowel Sounds] : normal bowel sounds [Abdomen Tenderness] : non-tender [Abnormal Walk] : normal gait [Musculoskeletal - Swelling] : no joint swelling seen [Cranial Nerves] : cranial nerves 2-12 were intact [No Focal Deficits] : no focal deficits [Oriented To Time, Place, And Person] : oriented to person, place, and time [Affect] : the affect was normal [Mood] : the mood was normal [FreeTextEntry1] : discolored ke skin

## 2022-03-10 NOTE — HISTORY OF PRESENT ILLNESS
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 ( quit a year agao) - recently underwent  massive reconstructive aortic surgery for a thoracic aneurysm (5/2021) - cr in 6/2021 was 1, in August repeat cr was 1.6 and by Sep had increased to 1.98 with a decline in GFR to ~32;\par \par Recalls he was discharged in 6/2021 on lasix 20mg daily  - dose has been adjusted frequently and was increased to 40mg daily, then 40mg alternating with 80mg daily and is now on 80mg daily as of a week ago.\par \par Noted increased le swelling within a month of surgery in 6/2021.\par \par Is followed by liver specialist at [facility] for cirrhosis.\par \par PMH: no CKD\par \par 1/2022\par here for f/u \par lasix reduced from 80 mg daily to alternating doses of 40 and 20, and as of 1/3/2022 has been on 20mg daily - \par virtually no le edema, but dose have ascites\par weight stable ~150\par cr has been ~ 2 \par \par 3/2022\par here for f/u \par lasix reduced from 80 mg dailyin 11/2021  and as of 1/3/2022 has been on 20mg daily - \par virtually no le edema, but dose have ascites\par weight stable ~150\par cr has been ~ 2 -1.9\par still c/o postherpetic neuralgia sole of L foot\par

## 2022-03-10 NOTE — ASSESSMENT
[FreeTextEntry1] : FRANCINE vs CKD likely prerenal from diuretics in setting of decreased oncotic pressure from low albumin from cirrhosis - vs HRS, diuretics must be used with caution, dose reduced  from 80 to 40/20 and now 20 daily, will try to reduce further 20/10\par - recheck UPC, Lu and Ucr\par - recheck BMET, Salb, coags\par \par Anemia\par -not likely from CKD, suspect from marrow suppression from longstanding ETOH use vs sequestration from cirrhosis ( hypersplenism) though not noted on MRI in 5/2021\par \par Hyperuricemia\par - Uric  acid 12.2 9/2021--> 7.8 on allopurinol, increased to 200mg\par \par Reviewed ECHO\par Reviewed MRI\par Reviewed BMET, uric acid and CBC from Dr. Corona\par reviewed GI note from Dr. vasquez\par \par Post herpetic pain\par - deferred using topical diclofenac\par -  deferred pool therapy\par - refer to pneurology\par \par Rx zoster vaccine\par \par \par discussed CKD, management and treatment to prevent diseae progression,including avoiding NSAIDs, adjusting diuretics, staying well hydrated, treating hyperuricemia including adjusting diet, treating hypercholesterolemia including both diet and pharmacologic, management of cirrhosis \par \par > 40 minutes spent reviewing chart, meds, labs, imaging, notes, writing note and discussing aforementioned with pt\par f/u in 3 months\par

## 2022-05-16 ENCOUNTER — RESULT REVIEW (OUTPATIENT)
Age: 72
End: 2022-05-16

## 2022-05-16 ENCOUNTER — APPOINTMENT (OUTPATIENT)
Dept: GASTROENTEROLOGY | Facility: CLINIC | Age: 72
End: 2022-05-16
Payer: MEDICARE

## 2022-05-16 VITALS
SYSTOLIC BLOOD PRESSURE: 108 MMHG | DIASTOLIC BLOOD PRESSURE: 62 MMHG | WEIGHT: 160 LBS | HEIGHT: 68 IN | HEART RATE: 77 BPM | TEMPERATURE: 97 F | BODY MASS INDEX: 24.25 KG/M2

## 2022-05-16 PROCEDURE — 99214 OFFICE O/P EST MOD 30 MIN: CPT

## 2022-05-16 NOTE — PHYSICAL EXAM
[FreeTextEntry1] : Abdomen seems mildly distended, and there seems to be diffuse voluntary but not involuntary guarding consistent with previous examinations.  I do not feel that there is a great amount of ascites within the abdominal cavity.  I can understand however why there is this feeling of tenseness to the abdominal musculature.

## 2022-05-16 NOTE — ASSESSMENT
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  This was never confirmed at surgery however.  The patient had a successful reconstructive operation at [facility].\par 2.  His cirrhosis seems stable but I am ordering an alpha-fetoprotein pro time and PTT and also blood ammonia as he does have daytime fatigue\par 3.  We will get another MRI with and without contrast of the abdomen to assess for hepatoma and of the other changes.\par The MRI is how we found his thoracic aorta last year\par \par Perhaps he will need an EGD.\par \par I will send a copy of this report to Dr. Corona as well\par \par More than 50% of the face to face time was devoted to counseling and /or coordination of care.  THis coordination of care may have included reviewing other medical notes and reports, and communicating with other health professionals\par

## 2022-05-16 NOTE — HISTORY OF PRESENT ILLNESS
[FreeTextEntry1] : Patient returns to the office for a follow-up appointment with particular reference to his cirrhosis and also to his ongoing upper abdominal pain.\par He has had visits to hepatologist at [facility] but I do not have the records\par \par I find that his abdominal pain is ongoing, he had a CT of the abdomen some 4 to 6 months ago, and it did not demonstrate any particular abnormality except for some "thickening of the pylorus.\par \par There was "diffuse mesenteric edema "and cirrhotic morphology of the liver.\par \par Ascites of any significant degree was not identified.\par \par We subsequently did an upper GI series and there was no sign of real obstruction.\par \par He has had his last EGD in April 2021.\par Its been 1 year plus since his aortic reconstructive surgery which was done back in April 2021 and I feel that he has had an excellent response and recovery from the surgery itself

## 2022-06-03 ENCOUNTER — RESULT REVIEW (OUTPATIENT)
Age: 72
End: 2022-06-03

## 2022-06-09 ENCOUNTER — APPOINTMENT (OUTPATIENT)
Dept: NEPHROLOGY | Facility: CLINIC | Age: 72
End: 2022-06-09
Payer: MEDICARE

## 2022-06-09 VITALS
HEART RATE: 72 BPM | DIASTOLIC BLOOD PRESSURE: 60 MMHG | WEIGHT: 160 LBS | OXYGEN SATURATION: 96 % | BODY MASS INDEX: 24.25 KG/M2 | SYSTOLIC BLOOD PRESSURE: 124 MMHG | HEIGHT: 68 IN

## 2022-06-09 DIAGNOSIS — R60.0 LOCALIZED EDEMA: ICD-10-CM

## 2022-06-09 PROCEDURE — 99214 OFFICE O/P EST MOD 30 MIN: CPT

## 2022-06-09 NOTE — ASSESSMENT
[FreeTextEntry1] :  CKD likely prerenal from diuretics in setting of decreased oncotic pressure from low albumin from cirrhosis  as well as decreased venous return from splenic vein thrombosis  +/- vs HRS, diuretics must be used with caution, dose reduced  from 80 to 40/20 and now 20 daily, - may benefit from spironolactone\par \par Anemia\par -not likely from CKD, suspect from marrow suppression from longstanding ETOH use vs sequestration from cirrhosis ( hypersplenism) though not noted on MRI in 5/2021\par \par Hyperuricemia\par - Uric  acid 12.2 9/2021--> 7.8 on allopurinol, increased to 200mg\par \par Splenic vein thrombosis\par - explains ascites and edema ( not from valvular heart disease)\par - f/u with GI\par ? spionolactone\par \par \par Reiviewed MRI\par Reviewed ECHO\par Reviewed MRI\par Reviewed BMET, uric acid and CBC from Dr. Corona\par reviewed GI note from Dr. vasquez\par \par Post herpetic pain\par - deferred using topical diclofenac\par -  deferred pool therapy\par - refer to pneurology\par \par Rx zoster vaccine\par \par \par discussed CKD, management and treatment to prevent diseae progression,including avoiding NSAIDs, adjusting diuretics, staying well hydrated, treating hyperuricemia including adjusting diet, treating hypercholesterolemia including both diet and pharmacologic, management of cirrhosis \par \par > 40 minutes spent reviewing chart, meds, labs, imaging, notes, writing note and discussing aforementioned with pt\par f/u in 3 months\par

## 2022-06-09 NOTE — HISTORY OF PRESENT ILLNESS
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 ( quit a year agao) - recently underwent  massive reconstructive aortic surgery for a thoracic aneurysm (5/2021) - cr in 6/2021 was 1, in August repeat cr was 1.6 and by Sep had increased to 1.98 with a decline in GFR to ~32;\par \par Recalls he was discharged in 6/2021 on lasix 20mg daily  - dose has been adjusted frequently and was increased to 40mg daily, then 40mg alternating with 80mg daily and is now on 80mg daily as of a week ago.\par \par Noted increased le swelling within a month of surgery in 6/2021.\par \par Is followed by liver specialist at [facility] for cirrhosis.\par \par PMH: no CKD\par \par 6/2022\par here for f/u \par - had MRI, roldan kaba have splenic vein thrombosis and has known cirrhosis which would explain the edema he describes in legs and ascites ( not 2/2 to cardiac valvulopathy)\par - lasix reduced from 80 mg daily to alternating doses of 40 and 20, and as of 1/3/2022 has been on 20mg daily - \par some le edema and  ascites - had MRI, roldan kaba have splenic vein thrombosis and has known cirrhosis whcoh would explain the edema\par weight stable ~150\par cr has been ~ 2\par - GI note reviewed\par MRI reviewed\par CMET reveiwed\par - ? spirponolactone \par \par 1/2022\par here for f/u \par lasix reduced from 80 mg daily to alternating doses of 40 and 20, and as of 1/3/2022 has been on 20mg daily - \par virtually no le edema, but dose have ascites\par weight stable ~150\par cr has been ~ 2 \par \par 3/2022\par here for f/u \par lasix reduced from 80 mg dailyin 11/2021  and as of 1/3/2022 has been on 20mg daily - \par virtually no le edema, but dose have ascites\par weight stable ~150\par cr has been ~ 2 -1.9\par still c/o postherpetic neuralgia sole of L foot\par

## 2022-06-09 NOTE — PHYSICAL EXAM
[General Appearance - Alert] : alert [General Appearance - In No Acute Distress] : in no acute distress [Sclera] : the sclera and conjunctiva were normal [Extraocular Movements] : extraocular movements were intact [Outer Ear] : the ears and nose were normal in appearance [Hearing Threshold Finger Rub Not [place]] : hearing was normal [Neck Appearance] : the appearance of the neck was normal [] : no respiratory distress [Exaggerated Use Of Accessory Muscles For Inspiration] : no accessory muscle use [Apical Impulse] : the apical impulse was normal [Heart Sounds] : normal S1 and S2 [Bowel Sounds] : normal bowel sounds [Abdomen Tenderness] : non-tender [Abnormal Walk] : normal gait [Musculoskeletal - Swelling] : no joint swelling seen [Cranial Nerves] : cranial nerves 2-12 were intact [No Focal Deficits] : no focal deficits [Oriented To Time, Place, And Person] : oriented to person, place, and time [Affect] : the affect was normal [Mood] : the mood was normal [FreeTextEntry1] : discolored ke skin

## 2022-06-13 ENCOUNTER — NON-APPOINTMENT (OUTPATIENT)
Age: 72
End: 2022-06-13

## 2022-06-30 ENCOUNTER — LABORATORY RESULT (OUTPATIENT)
Age: 72
End: 2022-06-30

## 2022-06-30 ENCOUNTER — APPOINTMENT (OUTPATIENT)
Dept: NEUROLOGY | Facility: CLINIC | Age: 72
End: 2022-06-30

## 2022-06-30 ENCOUNTER — NON-APPOINTMENT (OUTPATIENT)
Age: 72
End: 2022-06-30

## 2022-06-30 VITALS
DIASTOLIC BLOOD PRESSURE: 70 MMHG | OXYGEN SATURATION: 98 % | TEMPERATURE: 98.7 F | SYSTOLIC BLOOD PRESSURE: 125 MMHG | BODY MASS INDEX: 24.25 KG/M2 | HEIGHT: 68 IN | WEIGHT: 160 LBS | HEART RATE: 61 BPM

## 2022-06-30 DIAGNOSIS — Z86.19 PERSONAL HISTORY OF OTHER INFECTIOUS AND PARASITIC DISEASES: ICD-10-CM

## 2022-06-30 PROCEDURE — 99205 OFFICE O/P NEW HI 60 MIN: CPT

## 2022-06-30 RX ORDER — ZOLPIDEM TARTRATE 10 MG/1
10 TABLET ORAL
Qty: 30 | Refills: 0 | Status: COMPLETED | COMMUNITY
Start: 2022-03-28

## 2022-06-30 RX ORDER — MOXIFLOXACIN OPHTHALMIC 5 MG/ML
0.5 SOLUTION/ DROPS OPHTHALMIC
Qty: 3 | Refills: 0 | Status: COMPLETED | COMMUNITY
Start: 2022-02-01

## 2022-06-30 RX ORDER — PREDNISOLONE ACETATE 10 MG/ML
1 SUSPENSION/ DROPS OPHTHALMIC
Qty: 5 | Refills: 0 | Status: COMPLETED | COMMUNITY
Start: 2022-02-01

## 2022-06-30 RX ORDER — METOPROLOL SUCCINATE 25 MG/1
25 TABLET, EXTENDED RELEASE ORAL
Qty: 30 | Refills: 0 | Status: COMPLETED | COMMUNITY
Start: 2022-05-25

## 2022-06-30 RX ORDER — AMOXICILLIN 875 MG/1
875 TABLET, FILM COATED ORAL
Qty: 10 | Refills: 0 | Status: COMPLETED | COMMUNITY
Start: 2022-06-22

## 2022-06-30 RX ORDER — AMOXICILLIN 500 MG/1
500 TABLET, FILM COATED ORAL
Qty: 21 | Refills: 0 | Status: COMPLETED | COMMUNITY
Start: 2022-01-11

## 2022-06-30 RX ORDER — BROMFENAC SODIUM 0.7 MG/ML
0.07 SOLUTION/ DROPS OPHTHALMIC
Qty: 3 | Refills: 0 | Status: COMPLETED | COMMUNITY
Start: 2022-02-01

## 2022-06-30 NOTE — PHYSICAL EXAM
[FreeTextEntry1] : Physical examination \par General: No acute distress, Awake, Alert. \par \par \par Mental status \par Awake, alert, and oriented to person, time and place, Normal attention span and concentration, Recent and remote memory intact, Language intact, Fund of knowledge intact. \par Cranial Nerves \par II: VFF \par III, IV, VI: PERRL, EOMI. \par V: Facial sensation is normal B/L. \par VII: Facial strength is normal B/L. \par VIII: Gross hearing is intact. \par IX, X: Palate is midline and elevates symmetrically. \par XI: Trapezius normal strength. \par XII: Tongue midline without atrophy or fasciculations. \par \par Motor exam \par Muscle tone - no evidence of rigidity or resistance in all 4 extremities. \par No atrophy or fasciculations \par Muscle Strength: arms and legs, proximal and distal flexors and extensors are normal \par No UE drift.\par \par Reflexes \par All present, normal, and symmetrical. \par Plantars right: mute. \par Plantars left: mute. \par Absent ankle jerks. \par \par Coordination \par Finger to nose: Normal. \par Heel to shin: Normal. \par \par Sensory \par absent vibration; proprioception intact\par dec pp up to middle of legs and arms.\par \par Gait \par WC bound- notes its painful to walk \par

## 2022-06-30 NOTE — HISTORY OF PRESENT ILLNESS
[FreeTextEntry1] : This is a 71-year-old man who is being seen in neurologic consultation for evaluation of bilateral foot pain.  Patient states the onset of his symptoms was 6 years ago.  At that time he was told that he has shingles at the bottom of his right foot.  Since then he has noted severe pain in the right foot.  He does not recall if he was ever treated with any specific medication.  He was not treated for shingles.  He notes it is really difficult for him to place pressure on his foot.\par Notes no loss of strength.  Mild balance problems but he does not want to walk due to the severe pain.\par \par He notes a lot of spasms at night.  His primary placed him on a parkinsonian medication which did not help.\par \par He does have chronic kidney disease and is being followed by Dr. King.  Creatinine is 1.9 right now.

## 2022-06-30 NOTE — REASON FOR VISIT
[Consultation] : a consultation visit [FreeTextEntry1] : Shingles in the past feels numbness,tingling on the feet unsteady, dizziness

## 2022-06-30 NOTE — CONSULT LETTER
[Dear  ___] : Dear  [unfilled], [Consult Letter:] : I had the pleasure of evaluating your patient, [unfilled]. [Please see my note below.] : Please see my note below. [FreeTextEntry3] : Sincerely,\par \par Ally Sanders M.D.\par

## 2022-06-30 NOTE — ASSESSMENT
[FreeTextEntry1] : Labs as outlined below.\par History of alcohol abuse- no longer drinking. Defer emg for now.\par Records from Dr. Milian.\par \par Begin Lyrica 25 mg - counseled him on sedation potential due to increased creatinine.\par Lidocaine cream.\par Advised him to not take zolpidem with Lyrica.\par will consider imaging left foot as its dispropotionately involved.

## 2022-07-01 ENCOUNTER — NON-APPOINTMENT (OUTPATIENT)
Age: 72
End: 2022-07-01

## 2022-07-05 LAB
ALBUPE: 23.4 %
ALPHA1UPE: 30 %
ALPHA2UPE: 19 %
B BURGDOR AB SER-IMP: POSITIVE
B BURGDOR IGG+IGM SER QL: 1.27 INDEX
BETAUPE: 12.1 %
CERULOPLASMIN SERPL-MCNC: 27 MG/DL
COPPER SERPL-MCNC: 126 UG/DL
ESTIMATED AVERAGE GLUCOSE: 126 MG/DL
FOLATE SERPL-MCNC: 10.4 NG/ML
GAMMAUPE: 15.5 %
HBA1C MFR BLD HPLC: 6 %
IGA 24H UR QL IFE: NORMAL
KAPPA LC 24H UR QL: NORMAL
PROT PATTERN 24H UR ELPH-IMP: NORMAL
PROT UR-MCNC: 7 MG/DL
PROT UR-MCNC: 7 MG/DL
VIT B12 SERPL-MCNC: 669 PG/ML

## 2022-07-07 LAB
ALBUMIN MFR SERPL ELPH: 48.3 %
ALBUMIN SERPL-MCNC: 4 G/DL
ALBUMIN/GLOB SERPL: 1 RATIO
ALPHA1 GLOB MFR SERPL ELPH: 3.2 %
ALPHA1 GLOB SERPL ELPH-MCNC: 0.3 G/DL
ALPHA2 GLOB MFR SERPL ELPH: 8.6 %
ALPHA2 GLOB SERPL ELPH-MCNC: 0.7 G/DL
B-GLOBULIN MFR SERPL ELPH: 12.1 %
B-GLOBULIN SERPL ELPH-MCNC: 1 G/DL
GAMMA GLOB FLD ELPH-MCNC: 2.3 G/DL
GAMMA GLOB MFR SERPL ELPH: 27.8 %
INTERPRETATION SERPL IEP-IMP: NORMAL
M PROTEIN MFR SERPL ELPH: NORMAL
MONOCLON BAND OBS SERPL: NORMAL
PROT SERPL-MCNC: 8.2 G/DL
PROT SERPL-MCNC: 8.2 G/DL
VIT B1 SERPL-MCNC: 107.3 NMOL/L

## 2022-07-08 ENCOUNTER — NON-APPOINTMENT (OUTPATIENT)
Age: 72
End: 2022-07-08

## 2022-07-14 ENCOUNTER — APPOINTMENT (OUTPATIENT)
Dept: HEPATOLOGY | Facility: CLINIC | Age: 72
End: 2022-07-14

## 2022-07-18 LAB — VIT B6 SERPL-MCNC: 19.8 UG/L

## 2022-07-22 ENCOUNTER — APPOINTMENT (OUTPATIENT)
Dept: GASTROENTEROLOGY | Facility: CLINIC | Age: 72
End: 2022-07-22

## 2022-07-25 ENCOUNTER — RESULT REVIEW (OUTPATIENT)
Age: 72
End: 2022-07-25

## 2022-07-27 ENCOUNTER — APPOINTMENT (OUTPATIENT)
Dept: GASTROENTEROLOGY | Facility: HOSPITAL | Age: 72
End: 2022-07-27

## 2022-08-14 ENCOUNTER — RESULT REVIEW (OUTPATIENT)
Age: 72
End: 2022-08-14

## 2022-08-14 ENCOUNTER — TRANSCRIPTION ENCOUNTER (OUTPATIENT)
Age: 72
End: 2022-08-14

## 2022-08-16 ENCOUNTER — RESULT REVIEW (OUTPATIENT)
Age: 72
End: 2022-08-16

## 2022-08-17 ENCOUNTER — TRANSCRIPTION ENCOUNTER (OUTPATIENT)
Age: 72
End: 2022-08-17

## 2022-08-17 ENCOUNTER — APPOINTMENT (OUTPATIENT)
Dept: GASTROENTEROLOGY | Facility: HOSPITAL | Age: 72
End: 2022-08-17

## 2022-08-22 ENCOUNTER — RESULT REVIEW (OUTPATIENT)
Age: 72
End: 2022-08-22

## 2022-09-01 ENCOUNTER — APPOINTMENT (OUTPATIENT)
Dept: NEUROLOGY | Facility: CLINIC | Age: 72
End: 2022-09-01

## 2022-09-01 VITALS
OXYGEN SATURATION: 96 % | WEIGHT: 160 LBS | HEART RATE: 55 BPM | BODY MASS INDEX: 24.25 KG/M2 | TEMPERATURE: 97.7 F | DIASTOLIC BLOOD PRESSURE: 56 MMHG | HEIGHT: 68 IN | SYSTOLIC BLOOD PRESSURE: 127 MMHG

## 2022-09-01 PROCEDURE — 99215 OFFICE O/P EST HI 40 MIN: CPT

## 2022-09-01 RX ORDER — ROSUVASTATIN CALCIUM 5 MG/1
5 TABLET, FILM COATED ORAL DAILY
Refills: 0 | Status: ACTIVE | COMMUNITY

## 2022-09-06 NOTE — ASSESSMENT
[FreeTextEntry1] : Labs showed abnormal SPEP- heme referral again.\par \par emg/ncv routinely after MRI \par \par MRI lumbar spine. \par \par Increase Pregabalin to 75 mg daily\par Add Duloxetine 30 mg in am in two weeks\par \par May stop Lidocaine - it didn't help\par \par Advised him to not take zolpidem with Lyrica.

## 2022-09-06 NOTE — HISTORY OF PRESENT ILLNESS
[FreeTextEntry1] : Here in f/u.\par Notes some relief but not consistent\par Difficulty walking due to foot pain but also notes legs hurting and getting tired.\par \par Did partial treatment for lymes disease as f/u tests were normal.\par \par No side effects to lyrica\par no help with lidocaine. \par \par 6/30/22\par This is a 71-year-old man who is being seen in neurologic consultation for evaluation of bilateral foot pain.  Patient states the onset of his symptoms was 6 years ago.  At that time he was told that he has shingles at the bottom of his right foot.  Since then he has noted severe pain in the right foot.  He does not recall if he was ever treated with any specific medication.  He was not treated for shingles.  He notes it is really difficult for him to place pressure on his foot.\par Notes no loss of strength.  Mild balance problems but he does not want to walk due to the severe pain.\par \par He notes a lot of spasms at night.  His primary placed him on a parkinsonian medication which did not help.\par \par He does have chronic kidney disease and is being followed by Dr. Kign.  Creatinine is 1.9 right now.

## 2022-09-14 ENCOUNTER — APPOINTMENT (OUTPATIENT)
Dept: NEPHROLOGY | Facility: CLINIC | Age: 72
End: 2022-09-14

## 2022-09-28 ENCOUNTER — RESULT REVIEW (OUTPATIENT)
Age: 72
End: 2022-09-28

## 2022-09-28 ENCOUNTER — APPOINTMENT (OUTPATIENT)
Dept: HEMATOLOGY ONCOLOGY | Facility: CLINIC | Age: 72
End: 2022-09-28

## 2022-09-28 VITALS
HEIGHT: 68 IN | BODY MASS INDEX: 24.79 KG/M2 | HEART RATE: 49 BPM | DIASTOLIC BLOOD PRESSURE: 52 MMHG | WEIGHT: 163.56 LBS | TEMPERATURE: 97.1 F | SYSTOLIC BLOOD PRESSURE: 141 MMHG | OXYGEN SATURATION: 96 % | RESPIRATION RATE: 18 BRPM

## 2022-09-28 DIAGNOSIS — Z87.448 PERSONAL HISTORY OF OTHER DISEASES OF URINARY SYSTEM: ICD-10-CM

## 2022-09-28 DIAGNOSIS — K70.30 ALCOHOLIC CIRRHOSIS OF LIVER W/OUT ASCITES: ICD-10-CM

## 2022-09-28 DIAGNOSIS — Z78.9 OTHER SPECIFIED HEALTH STATUS: ICD-10-CM

## 2022-09-28 DIAGNOSIS — Z86.79 PERSONAL HISTORY OF OTHER DISEASES OF THE CIRCULATORY SYSTEM: ICD-10-CM

## 2022-09-28 PROCEDURE — 36415 COLL VENOUS BLD VENIPUNCTURE: CPT

## 2022-09-28 PROCEDURE — 99215 OFFICE O/P EST HI 40 MIN: CPT | Mod: 25

## 2022-09-28 RX ORDER — DICLOFENAC SODIUM 1% 10 MG/G
1 GEL TOPICAL DAILY
Qty: 1 | Refills: 0 | Status: COMPLETED | COMMUNITY
Start: 2022-01-13 | End: 2022-09-28

## 2022-09-28 RX ORDER — METOPROLOL SUCCINATE 25 MG/1
25 TABLET, EXTENDED RELEASE ORAL
Refills: 0 | Status: COMPLETED | COMMUNITY
End: 2022-09-28

## 2022-09-28 NOTE — HISTORY OF PRESENT ILLNESS
[de-identified] : Mr. Rojas is a 72 year old man who presents for initial consultation of MGUS.\par He was seen by Dr. Sanders for difficulty walking, foot pain, and nightly spasms.\par His foot pain started about 6 years ago and he was initially told he had shingles, but he was never treated for shingles.\par Blood work drawn revealed a weak IgG lambda band, M spike unable to quantitate\par He has ongoing anemia, but that could be due to his CKD for which he is followed by Dr. King\par \par He reports ongoing pain to his right foot which  has forced him to use a wheelchair.\par He reports ongoing feeling dizziness and lightheadedness\par He denies nausea, vomiting, constipation, diarrhea, and bleeding\par \par Health Maintenance:\par EGD 8/2022\par [facility] about 5 years ago\par Former Smoker x 50 years, 1PPD at max, quit 18 months ago\par Quit drinking about 18 months ago, 5-6 whiskeys per night\par \par 3 children alive and well\par \par Retired

## 2022-09-28 NOTE — ASSESSMENT
[FreeTextEntry1] : #  Monoclonal Gammopathy\par -We have discussed the diagnosis of Monoclonal Gammopathy. \par -We discussed the criteria for MGUS and that it is a clinically asymptomatic premalignant clonal plasma cell or lymphoplasmacytic proliferative disorder however given findings we do need to rule out Multiple myeloma.\par -We discussed that Each year about 1% of people with MGUS go on to develop certain types of hematologic malignancies such as Multiple myeloma, Light chain amyloidosis, Waldenstrom macroglobulinemia or Lymphoma.\par -Given findings of monoclonal gammopathy will check Complete blood count, CMP, SPEP with SELAM, UPEP with SELAM, Urine for Bence Roe proteins, sFLC, Quantitative Immunoglobulins, B2 microglobulin, LDH  \par - May need to do BM bx in the future pending results. \par - We did discuss that although a bone marrow evaluation is indicated for all patients with MM at diagnosis, it may be deferred for persons that are clinically suspected of having MGUS with a small M-protein (less than 1.5 g/100 mL), minimal or no abnormalities in serum FLCs, and no end-organ damage. \par -PET scan \par \par #cirrhosis\par \par #CKD\par sees Dr. King \par \par # anemia\par We have discussed the differential diagnosis of anemia.\par Will also rule out causes of anemia including nutritional deficiencies, thyroid dysfunction, hemolysis and hematologic disorders. Will check CBC with diff, CMP, LDH, Haptoglobin, Jacqueline, B12, Folate, TSH, retic ct, Epo, PS, ESR/CRP, immunoglobulins, SELAM, iron and ferritin\par \par RTC in 3 weeks to review blood work and PET CT and to determine if BM bx is needed\par

## 2022-09-30 ENCOUNTER — APPOINTMENT (OUTPATIENT)
Dept: HEMATOLOGY ONCOLOGY | Facility: CLINIC | Age: 72
End: 2022-09-30

## 2022-10-14 ENCOUNTER — RX RENEWAL (OUTPATIENT)
Age: 72
End: 2022-10-14

## 2022-10-26 ENCOUNTER — RESULT REVIEW (OUTPATIENT)
Age: 72
End: 2022-10-26

## 2022-11-03 ENCOUNTER — APPOINTMENT (OUTPATIENT)
Dept: GASTROENTEROLOGY | Facility: CLINIC | Age: 72
End: 2022-11-03

## 2022-11-03 VITALS
HEART RATE: 51 BPM | HEIGHT: 68 IN | BODY MASS INDEX: 24.25 KG/M2 | DIASTOLIC BLOOD PRESSURE: 52 MMHG | SYSTOLIC BLOOD PRESSURE: 127 MMHG | TEMPERATURE: 98.3 F | WEIGHT: 160 LBS | OXYGEN SATURATION: 97 %

## 2022-11-03 PROCEDURE — 99215 OFFICE O/P EST HI 40 MIN: CPT

## 2022-11-03 NOTE — HISTORY OF PRESENT ILLNESS
[FreeTextEntry1] : Mr Rojas is in the office to discuss his ongoing symptoms, and his recent PET scan ordered by dr Carolee Herman because of his Monoclonal gammopathy of unknown significance\par \par \par \par the symptoms have remained unrelenting, ongoing abdominal pain, some abdominal distention, no signif ascites found, cirrhosis, ph of surgical repair of aneurysm of descending aorta\par and yet we still don’t have one unitary unifying diagnosis that we can treat\par \par sed rate up, crp not elevated\par microcytic anemia that does not seem obviously an iron deficiency\par \par patient has had several egds, and he had colonoscopy dr biswas in feb 2021\par \par patient has not lost weight

## 2022-11-03 NOTE — ASSESSMENT
[FreeTextEntry1] : the PET scan is abnormal\par \par 1.  13 mmnodule, hypermetabolic, at the rulobe paravertebral, which is concerning and requires a biopsy...this must be further evaluated because it could be a malignant lesion, especially in a previous smoker\par 2.  a 2.4 cm lenticular pleural based opacity at the left midlung with mild uptake\par 3.  foacal uptake anterior anal canal, and i do not feel any corresponding abnormality, but we could evaluate with scope\par 4.  short segment of fdg uptake in the wall of mid and distal descending thoracic aorta, consistent with aortitis.\par 5.  finally, 'hazy opacification of the mesenteric fat perhaps representing mesenteric panniculitis.  \par \par could this be related to his symptoms, as he has had this for one year or more.\par \par i have spoken to Dr Corona\par \par i am advising that the patient follow up with his thoracic surgeon, dr Eddie Curiel\par [phone]\par \par fax\par [phone]\par \par

## 2022-11-03 NOTE — PHYSICAL EXAM
[Normal Sphincter Tone] : normal sphincter tone [No External Hemorrhoid] : no external hemorrhoids [No Rectal Mass] : no rectal mass [de-identified] : moderate distention, some firmness to the abdomen, not soft mildly tender diffusely to palpation..feels abnormal but no actual mass can be palpated [de-identified] : no stool, no anal lesion as was suggested by uptake in recent PET

## 2022-11-04 ENCOUNTER — APPOINTMENT (OUTPATIENT)
Dept: HEMATOLOGY ONCOLOGY | Facility: CLINIC | Age: 72
End: 2022-11-04

## 2022-11-04 VITALS
RESPIRATION RATE: 18 BRPM | TEMPERATURE: 98.3 F | OXYGEN SATURATION: 97 % | WEIGHT: 160 LBS | DIASTOLIC BLOOD PRESSURE: 55 MMHG | HEART RATE: 50 BPM | BODY MASS INDEX: 24.25 KG/M2 | SYSTOLIC BLOOD PRESSURE: 112 MMHG | HEIGHT: 68 IN

## 2022-11-04 DIAGNOSIS — R91.1 SOLITARY PULMONARY NODULE: ICD-10-CM

## 2022-11-04 PROCEDURE — 99215 OFFICE O/P EST HI 40 MIN: CPT | Mod: 25

## 2022-11-04 PROCEDURE — 36415 COLL VENOUS BLD VENIPUNCTURE: CPT

## 2022-11-04 NOTE — ASSESSMENT
[FreeTextEntry1] : #  Monoclonal Gammopathy- IgG Lambda\par - m spike unable to quantitate\par -anemia due to OLIVE\par - K:L ratio wnl\par -PET CT - no bone lesions\par - no hypercalcemia\par -CKD noted, no bence ledezma proteins\par \par #cirrhosis\par \par #CKD\par sees Dr. King \par \par # anemia - OLIVE \par needs iron supplementation\par \par #lung nodule seen on PET CT concerning for malignancy\par will biopsy\par \par #abd pain\par managed per Dr. Winn\par mesenteric adenopathy - will see if amenable to biopsy\par splenic vein thrombosis seen on MRI - will try to obtain scan for us to review. PET didn't mention\par \par RTC in 3 weeks to review path\par \par

## 2022-11-04 NOTE — HISTORY OF PRESENT ILLNESS
[de-identified] : Mr. Rojas is a 72 year old man who presents for initial consultation of MGUS.\par He was seen by Dr. Sanders for difficulty walking, foot pain, and nightly spasms.\par His foot pain started about 6 years ago and he was initially told he had shingles, but he was never treated for shingles.\par Blood work drawn revealed a weak IgG lambda band, M spike unable to quantitate\par He has ongoing anemia, but that could be due to his CKD for which he is followed by Dr. King\par \par He reports ongoing pain to his right foot which  has forced him to use a wheelchair.\par He reports ongoing feeling dizziness and lightheadedness\par He denies nausea, vomiting, constipation, diarrhea, and bleeding\par \par Health Maintenance:\par EGD 8/2022\par CNY about 5 years ago\par Former Smoker x 50 years, 1PPD at max, quit 18 months ago\par Quit drinking about 18 months ago, 5-6 whiskeys per night\par \par 3 children alive and well\par \par Retired  [de-identified] : Patient seen and examined and here today for follow up\par Had PET CT\par still complains of abd pain and distension\par

## 2022-11-11 ENCOUNTER — RESULT REVIEW (OUTPATIENT)
Age: 72
End: 2022-11-11

## 2022-11-13 ENCOUNTER — RESULT REVIEW (OUTPATIENT)
Age: 72
End: 2022-11-13

## 2022-11-14 ENCOUNTER — RESULT REVIEW (OUTPATIENT)
Age: 72
End: 2022-11-14

## 2022-11-23 ENCOUNTER — APPOINTMENT (OUTPATIENT)
Dept: HEMATOLOGY ONCOLOGY | Facility: CLINIC | Age: 72
End: 2022-11-23

## 2022-11-23 VITALS
RESPIRATION RATE: 16 BRPM | HEART RATE: 54 BPM | WEIGHT: 164.7 LBS | HEIGHT: 68 IN | BODY MASS INDEX: 24.96 KG/M2 | OXYGEN SATURATION: 98 % | DIASTOLIC BLOOD PRESSURE: 60 MMHG | SYSTOLIC BLOOD PRESSURE: 122 MMHG | TEMPERATURE: 97 F

## 2022-11-23 PROCEDURE — 36415 COLL VENOUS BLD VENIPUNCTURE: CPT

## 2022-11-23 PROCEDURE — 99215 OFFICE O/P EST HI 40 MIN: CPT | Mod: 25

## 2022-11-23 NOTE — ASSESSMENT
[FreeTextEntry1] : #  Monoclonal Gammopathy- IgG Lambda\par - m spike unable to quantitate\par -anemia due to OLIVE\par - K:L ratio wnl\par -PET CT - no bone lesions\par - no hypercalcemia\par -CKD noted, no bence ledezma proteins\par \par #cirrhosis\par \par #CKD\par sees Dr. King \par \par # anemia - OLIVE \par needs iron supplementation\par \par #lung adenocarcinoma\par reviewed diagnosis\par reviewed PET CT\par check PFTS\par check MRI brain\par Will discuss surgical possibility with Dr. Lund - left message for him to call back\par will discuss if L side needs to be biopsied\par will discuss in [facility] on 12/12\par awaiting path regarding EGFR status\par \par #abd pain\par managed per Dr. Winn\par mesenteric adenopathy - will see if amenable to biopsy\par splenic vein thrombosis seen on MRI - will try to obtain scan for us to review. PET didn't mention\par \par #aortitis - will dw Dr. Capone if CT angio needed\par \par # mesenteric fat with associate LAD \par recommend abd pain\par \par RTC in 4 weeks with cbc with diff, cmp, LDH, retic, ESR/CRP\par

## 2022-11-23 NOTE — HISTORY OF PRESENT ILLNESS
[de-identified] : Mr. Rojas is a 72 year old man who presents for initial consultation of MGUS.\par He was seen by Dr. Sanders for difficulty walking, foot pain, and nightly spasms.\par His foot pain started about 6 years ago and he was initially told he had shingles, but he was never treated for shingles.\par Blood work drawn revealed a weak IgG lambda band, M spike unable to quantitate\par He has ongoing anemia, but that could be due to his CKD for which he is followed by Dr. King\par \par He reports ongoing pain to his right foot which  has forced him to use a wheelchair.\par He reports ongoing feeling dizziness and lightheadedness\par He denies nausea, vomiting, constipation, diarrhea, and bleeding\par \par Health Maintenance:\par EGD 8/2022\par CNY about 5 years ago\par Former Smoker x 50 years, 1PPD at max, quit 18 months ago\par Quit drinking about 18 months ago, 5-6 whiskeys per night\par \par 3 children alive and well\par \par Retired  [de-identified] : Patient seen and examined and here today for follow up\par still complains of abd pain and distension\par Tolerated 2 doses of iron thus far

## 2022-12-01 ENCOUNTER — APPOINTMENT (OUTPATIENT)
Dept: THORACIC SURGERY | Facility: CLINIC | Age: 72
End: 2022-12-01

## 2022-12-01 VITALS
DIASTOLIC BLOOD PRESSURE: 50 MMHG | HEIGHT: 68 IN | WEIGHT: 164 LBS | SYSTOLIC BLOOD PRESSURE: 118 MMHG | BODY MASS INDEX: 24.86 KG/M2 | OXYGEN SATURATION: 96 % | HEART RATE: 47 BPM

## 2022-12-01 DIAGNOSIS — Z87.891 PERSONAL HISTORY OF NICOTINE DEPENDENCE: ICD-10-CM

## 2022-12-01 PROCEDURE — 99214 OFFICE O/P EST MOD 30 MIN: CPT

## 2022-12-02 PROBLEM — Z87.891 FORMER SMOKER: Status: ACTIVE | Noted: 2022-12-02

## 2022-12-02 NOTE — PHYSICAL EXAM
[General Appearance - Alert] : alert [General Appearance - In No Acute Distress] : in no acute distress [General Appearance - Well Nourished] : well nourished [General Appearance - Well Developed] : well developed [Sclera] : the sclera and conjunctiva were normal [PERRL With Normal Accommodation] : pupils were equal in size, round, and reactive to light [Hearing Threshold Finger Rub Not [place]] : hearing was normal [Neck Appearance] : the appearance of the neck was normal [Jugular Venous Distention Increased] : there was no jugular-venous distention [Respiration, Rhythm And Depth] : normal respiratory rhythm and effort [Exaggerated Use Of Accessory Muscles For Inspiration] : no accessory muscle use [Auscultation Breath Sounds / Voice Sounds] : lungs were clear to auscultation bilaterally [Heart Rate And Rhythm] : heart rate was normal and rhythm regular [Heart Sounds] : normal S1 and S2 [Examination Of The Chest] : the chest was normal in appearance [Chest Visual Inspection Thoracic Asymmetry] : no chest asymmetry [Abdomen Soft] : soft [Abdomen Tenderness] : non-tender [Abdomen Hernia] : no hernia was discovered [] : no rash [Skin Lesions] : no skin lesions [No Focal Deficits] : no focal deficits [Oriented To Time, Place, And Person] : oriented to person, place, and time [Affect] : the affect was normal [Mood] : the mood was normal

## 2022-12-09 NOTE — HISTORY OF PRESENT ILLNESS
[FreeTextEntry1] : Mr. Rojas is a 72 year old man with a history of a thoracic aorta repair via left thoracotomy, MGUS,former smoker, who was recently found to have a RUL nodule, biopsy done and revealed adenocarcinoma. The imaging also revealed a left sided pleural abnormality. A PET was done, which was negative for evidence of distant mets, no lymphadenopathy. The patient reports feeling well overall, says that he has chronic pain in his feet, thought to be secondary to shingles, which limits his walking but otherwise he is able to walk up a flight of stairs without SOB or chest pain and is able to walk with an unlimited ET without SOB or chest pain. He denies cough, hemoptysis, f/c/s, or weight loss. He smoked for a long time and quit 1.5 years ago. He also quit drinking alcohol recently. He see GI for an enlarged liver and fluid in his abdomen, but says that the work up has not revealed an underlying diagnosis yet. He denies abd pain or distension currently, reports eating without issue.

## 2022-12-09 NOTE — DATA REVIEWED
[FreeTextEntry1] : PET Scan\par \par    1.3cm posterior RUL hypermetabolic nodule, SUV max 5.4.. 2.4 x 1.0cm pleural based left nodularity, SUV max 2.1. No lymphadenopathy or hypermetabolic nodes or evidence of distant mets

## 2022-12-09 NOTE — ASSESSMENT
[FreeTextEntry1] : 72M, with newly diagnosed RUL adenocarcinoma, with pleural nodularity on the left, unclear etiology, but does not appear similar on imaging, no lymphadenopathy, PFT's pending, unclear cardiopulmonary reserve because patient's ambulation and physical activity limited by chronic foot pain, but reports that he does not experience SOB or chest pain with activity and is able to walk up a flight of stairs and generally ambulate without SOB or CP. Also of concern is the liver disease he is being followed by GI for. His labs, including bili's, INR, and AST/ALT are not grossly abnormal, but he has some hepatomegaly and reports some fluid in his abdomen previously.  THE PET shows an FDG avid RUL nodule and the left pleural abnormality is also slightly avid. \par \par The plan will be to follow up the PFT's, refer the patient to cardiology for pre-op assessment and he will be getting a brain MRI that was ordered by his oncologist. Will also discuss the status of his liver work up with gastroenterologist.  His case will be discussed at the [place] tumor board on 12/12 and will follow up with the patient regarding recommendations at that point. Surgical options were discussed and the patient is interested in surgery if that is recommended.

## 2022-12-14 ENCOUNTER — APPOINTMENT (OUTPATIENT)
Dept: RADIATION ONCOLOGY | Facility: CLINIC | Age: 72
End: 2022-12-14
Payer: MEDICARE

## 2022-12-14 VITALS
DIASTOLIC BLOOD PRESSURE: 61 MMHG | HEART RATE: 49 BPM | RESPIRATION RATE: 18 BRPM | OXYGEN SATURATION: 97 % | SYSTOLIC BLOOD PRESSURE: 130 MMHG

## 2022-12-14 PROCEDURE — 99205 OFFICE O/P NEW HI 60 MIN: CPT | Mod: 25

## 2022-12-14 NOTE — VITALS
[Maximal Pain Intensity: 0/10] : 0/10 [Least Pain Intensity: 0/10] : 0/10 [70: Cares for self; unalbe to carry on normal activity or do active work.] : 70: Cares for self; unable to carry on normal activity or do active work. [Date: ____________] : Patient's last distress assessment performed on [unfilled]. [0 - No Distress] : Distress Level: 0

## 2022-12-15 ENCOUNTER — NON-APPOINTMENT (OUTPATIENT)
Age: 72
End: 2022-12-15

## 2022-12-15 ENCOUNTER — APPOINTMENT (OUTPATIENT)
Dept: HEART AND VASCULAR | Facility: CLINIC | Age: 72
End: 2022-12-15
Payer: MEDICARE

## 2022-12-15 VITALS
OXYGEN SATURATION: 96 % | HEIGHT: 67 IN | SYSTOLIC BLOOD PRESSURE: 139 MMHG | BODY MASS INDEX: 26.37 KG/M2 | DIASTOLIC BLOOD PRESSURE: 70 MMHG | WEIGHT: 168 LBS | HEART RATE: 52 BPM

## 2022-12-15 DIAGNOSIS — Z98.890 OTHER SPECIFIED POSTPROCEDURAL STATES: ICD-10-CM

## 2022-12-15 DIAGNOSIS — R00.1 BRADYCARDIA, UNSPECIFIED: ICD-10-CM

## 2022-12-15 DIAGNOSIS — E78.5 HYPERLIPIDEMIA, UNSPECIFIED: ICD-10-CM

## 2022-12-15 PROCEDURE — 99215 OFFICE O/P EST HI 40 MIN: CPT | Mod: 25

## 2022-12-15 PROCEDURE — 93242 EXT ECG>48HR<7D RECORDING: CPT

## 2022-12-15 PROCEDURE — 93000 ELECTROCARDIOGRAM COMPLETE: CPT

## 2022-12-15 RX ORDER — FUROSEMIDE 40 MG/1
40 TABLET ORAL DAILY
Refills: 0 | Status: DISCONTINUED | COMMUNITY
End: 2022-12-15

## 2022-12-15 RX ORDER — METOPROLOL SUCCINATE 25 MG/1
25 TABLET, EXTENDED RELEASE ORAL DAILY
Refills: 2 | Status: DISCONTINUED | COMMUNITY
End: 2022-12-15

## 2022-12-15 NOTE — ASSESSMENT
[FreeTextEntry1] : 72M hypertension, dyslipidemia, heavy smoker quit 2021, long-term alcohol abuse quit 2021, liver cirrhosis,  GI bleed in 2017 with large duodenal ulcer treated with embolization of gastroduodenal artery, CKD, anemia Hb 9.7, emergent descending thoracic aortic aneurysm with contained rupture s/p repair via left thoracotomy May 2021 at [facility], [facility], with newly diagnosed RUL adenocarcinoma, consideration for a robotic RUL lobectomy with decision now for radiation therapy.  \par \par Chest CT in August 2022 with normal sized descending thoracic aorta graft, ascending aorta stable at 3.6cm\par Echocardiogram in Dec 2021: hyperdynamic LV, no obstruction, no significant valve disease, tds\par \par /70\par EKG sinus delmy 49 \par \par Current Meds: spironolactone 25mg, lasix 10mg, amlodipine 5mg, Toprol XL 12.5mg \par \par Prior records/imaging reviewed. \par \par A: Patient describes frequent dizziness, especially with changes in posture.  Orthostatics in office are negative with no increase in HR with standing.   His most recent echo showed a hyperdynamic LVEF (underfilled) and he is on two diuretics and two antihypertensives.  Combined with a significant sinus bradycardia it is very likely this regimen is contributory to his symptoms.   His GFR is hovering around 30 and he is on nevin 25mg daily.  \par \par P:\par - Recommend discontinuing lasix and to decrease nevin to 12.5mg eod.  Stop metoprolol.  Counseled on home BP monitoring.   \par - place 72 hr holter monitor off of metoprolol \par - return to office in one month with BP logs, will repeat BMP at that time \par - continue crestor 5mg \par - obtain [facility] records from aortic repair.  It doesn’t sound like he had coronary evaluation due to emergent dissection.  Need to clarify.

## 2022-12-15 NOTE — HISTORY OF PRESENT ILLNESS
[FreeTextEntry1] : 72M hypertension, dyslipidemia, heavy smoker quit 2021, long-term alcohol abuse quit 2021, liver cirrhosis,  GI bleed in 2017 with large duodenal ulcer treated with embolization of gastroduodenal artery, CKD, anemia Hb 9.7, emergent descending thoracic aortic aneurysm with contained rupture s/p repair via left thoracotomy May 2021 at [facility], [facility], with newly diagnosed RUL adenocarcinoma, consideration for a robotic RUL lobectomy with decision now for radiation therapy.  \par \par Patient referred in part due to sinus bradycardia.  He is on metoprolol.   Started post aortic surgery. \par No cp, sob, palps, orthopnea, leg edema. \par Has chronic dizziness many years.  No syncopal episodes.  Symptoms are usually related to changes in posture. \par Accompanied by wife.  They are unsure of coronary workup prior to his aortic aneurysm repair. \par His physical activity is limited due to post herpetic neuralgia in legs.  Had shingles 5 yrs ago and has had chronic pain since then.\par \par Chest CT in August 2022 with normal sized descending thoracic aorta graft, ascending aorta stable at 3.6cm\par Echocardiogram in Dec 2021: hyperdynamic LV, no obstruction, no significant valve disease, tds\par \par /70\par EKG sinus delmy 49

## 2022-12-18 PROCEDURE — 93244 EXT ECG>48HR<7D REV&INTERPJ: CPT

## 2022-12-20 NOTE — LETTER CLOSING
[Consult Closing:] : Thank you for allowing me to participate in the care of this patient.  If you have any questions, please do not hesitate to contact me. [Sincerely yours,] : Sincerely yours, [FreeTextEntry3] : Sylvia Carrillo MD\par

## 2022-12-20 NOTE — DISEASE MANAGEMENT
[Clinical] : TNM Stage: c [FreeTextEntry4] : Adenocarcinoma of the right upper lobe [TTNM] : 2 [NTNM] : 0 [MTNM] : 0 [I] : I

## 2022-12-20 NOTE — HISTORY OF PRESENT ILLNESS
[FreeTextEntry1] : Mr. 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 Rojas is under the care of Dr. Herman for MGUS, he underwent a PET CT evaluation for MGUS on 10/26/22 which demonstrated:\par \par 1. 13 mm hypermetabolic nodule at the paravertebral posterior right upper lobe, SUV max 5.4, probably representing malignancy. \par  2. 2.4 cm lenticular pleural-based opacity at the left midlung with mild FDG uptake, indeterminate in nature. Metastatic disease could not be excluded.  \par  3. Focal uptake at the anterior anal canal, SUV max 12.2, possibly representing malignancy. Direct visualization and tissue typing was recommended.  \par  4. Short segments of intense FDG uptake in the wall of the mid and distal descending thoracic aorta consistent with aortitis. Further evaluation with CT angiogram was recommended to evaluate for ulceration.   \par  5. 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 Malignant involvement could not be entirely excluded.  \par \par On 11/14/22, Mr. Rojas underwent a CT guided right upper lobe lung biopsy and pathology revealed lung tissue showing a small area of fibroelastic scar with a few highly atypical epithelial cells consistent with adenocarcinoma.  \par \par Mr. Rojas reports no shortness of breath, no cough, no hemoptysis. He notes that his appetite is good and his weight has been stable. He denies any new back or bone pain. It should be noted that Mr. Rojas underwent a left thoracotomy for an emergent descending thoracic aortic aneurysm repair in 5/21 at [facility]. \par \par Social History:\par Former Smoker x 50 years, 1-2 PPD at max, quit 18 months ago\par Quit drinking about 18 months ago, 5-6 whiskey per night. \par \par Family Hx: father, believed to have had cancer but unsure, lived in [place]. \par \par He is pending scheduling of MRI of the brain to complete his staging evaluation. \par \par

## 2022-12-21 ENCOUNTER — APPOINTMENT (OUTPATIENT)
Dept: COLORECTAL SURGERY | Facility: CLINIC | Age: 72
End: 2022-12-21
Payer: MEDICARE

## 2022-12-21 ENCOUNTER — NON-APPOINTMENT (OUTPATIENT)
Age: 72
End: 2022-12-21

## 2022-12-21 VITALS
SYSTOLIC BLOOD PRESSURE: 149 MMHG | HEART RATE: 71 BPM | DIASTOLIC BLOOD PRESSURE: 75 MMHG | TEMPERATURE: 97.3 F | BODY MASS INDEX: 26.37 KG/M2 | WEIGHT: 168 LBS | OXYGEN SATURATION: 98 % | HEIGHT: 67 IN

## 2022-12-21 DIAGNOSIS — K62.89 OTHER SPECIFIED DISEASES OF ANUS AND RECTUM: ICD-10-CM

## 2022-12-21 PROCEDURE — 99204 OFFICE O/P NEW MOD 45 MIN: CPT

## 2022-12-21 RX ORDER — AMLODIPINE BESYLATE 5 MG/1
5 TABLET ORAL DAILY
Refills: 0 | Status: ACTIVE | COMMUNITY

## 2022-12-21 RX ORDER — ZOLPIDEM TARTRATE 10 MG/1
10 TABLET ORAL
Refills: 0 | Status: ACTIVE | COMMUNITY

## 2022-12-21 NOTE — HISTORY OF PRESENT ILLNESS
[FreeTextEntry1] : 72 year old male pt recently diagnosed with stage 2 T2NO RUL adenocarcinoma\par \par Pet Scan 10/26/22- showed focal uptake at anterior anal canal possible malignancy\par \par Here for evaluation of the anal canal.\par \par He feels 'terrible'.\par His abdomen feels bloated since the summer\par Denies nausea or vomiting \par Denies fevers or chills\par No rectal pain just abdominal pain along the left side where his chest incision is.\par Has a BM every day stools are soft \par Denies bleeding\par \par Last colonoscopy 2/2/21- diverticulosis and internal hemorrhoids

## 2022-12-21 NOTE — PHYSICAL EXAM
[Abdomen Masses] : No abdominal masses [Abdomen Tenderness] : ~T ~M Abdominal tenderness [No HSM] : no hepatosplenomegaly [No Rash or Lesion] : No rash or lesion [Alert] : alert [Oriented to Person] : oriented to person [Oriented to Place] : oriented to place [Oriented to Time] : oriented to time [Calm] : calm [de-identified] : Soft, but distended with mild tenderness [de-identified] : External - normal; IGNACIA - no mass palpable [de-identified] : Normal [de-identified] : Normal [de-identified] : Normal [de-identified] : Normal [de-identified] : Normal

## 2022-12-21 NOTE — ASSESSMENT
[FreeTextEntry1] : 72 referred to us for evaluation of a FDG avid mass in the anorectal area. On exam, the mass was not palpable on IGNACIA.\par Plan:\par Will order MRI scan anorectum to confirm the presence of the mass and its exact location.\par Further recommendations based on the scan results.\par All questions answered.

## 2022-12-21 NOTE — REVIEW OF SYSTEMS
[Abdominal Pain] : abdominal pain [Negative] : Heme/Lymph [As Noted in HPI] : as noted in HPI [Vomiting] : no vomiting

## 2022-12-28 ENCOUNTER — RESULT REVIEW (OUTPATIENT)
Age: 72
End: 2022-12-28

## 2023-01-10 ENCOUNTER — NON-APPOINTMENT (OUTPATIENT)
Age: 73
End: 2023-01-10

## 2023-01-10 VITALS
HEART RATE: 68 BPM | DIASTOLIC BLOOD PRESSURE: 61 MMHG | TEMPERATURE: 98.4 F | HEIGHT: 67 IN | RESPIRATION RATE: 18 BRPM | BODY MASS INDEX: 24.8 KG/M2 | OXYGEN SATURATION: 100 % | WEIGHT: 158 LBS | SYSTOLIC BLOOD PRESSURE: 144 MMHG

## 2023-01-10 NOTE — HISTORY OF PRESENT ILLNESS
[FreeTextEntry1] : Mr. Rojas is present for OTV. he is s/p fraction 3 / 5 of SBRT to the right lung.   Patient reports no SOB, or pain at treatment site.  He is experiencing some fatigue.   His  appetite is healthy, weight is stable.  He does have some abdominal discomfort which he has had before he started his treatment.  His bowels are regular.

## 2023-01-18 ENCOUNTER — RESULT REVIEW (OUTPATIENT)
Age: 73
End: 2023-01-18

## 2023-01-19 ENCOUNTER — APPOINTMENT (OUTPATIENT)
Dept: HEART AND VASCULAR | Facility: CLINIC | Age: 73
End: 2023-01-19
Payer: MEDICARE

## 2023-01-19 VITALS
WEIGHT: 158 LBS | HEART RATE: 56 BPM | OXYGEN SATURATION: 96 % | BODY MASS INDEX: 24.8 KG/M2 | DIASTOLIC BLOOD PRESSURE: 60 MMHG | SYSTOLIC BLOOD PRESSURE: 120 MMHG | HEIGHT: 67 IN

## 2023-01-19 DIAGNOSIS — I10 ESSENTIAL (PRIMARY) HYPERTENSION: ICD-10-CM

## 2023-01-19 DIAGNOSIS — R42 DIZZINESS AND GIDDINESS: ICD-10-CM

## 2023-01-19 PROCEDURE — 99214 OFFICE O/P EST MOD 30 MIN: CPT

## 2023-01-19 NOTE — ASSESSMENT
[FreeTextEntry1] : 72M hypertension, dyslipidemia, heavy smoker quit 2021, long-term alcohol abuse quit 2021, liver cirrhosis,  GI bleed in 2017 with large duodenal ulcer treated with embolization of gastroduodenal artery, CKD, anemia Hb 9.7, emergent descending thoracic aortic aneurysm with contained rupture s/p repair via left thoracotomy May 2021 at [facility], [facility], with newly diagnosed RUL adenocarcinoma s/p radiation therapy.  \par \par Chest CT in August 2022 with normal sized descending thoracic aorta graft, ascending aorta stable at 3.6cm\par Echocardiogram in Dec 2021: hyperdynamic LV, no obstruction, no significant valve disease, tds\par \par EKG sinus delmy 49 on 12/15/22\par BP today 120/60, HR 56\par Current Meds: spironolactone 12.5mg eod, amlodipine 5mg \par \par A: Patient describes frequent dizziness, especially with changes in posture.  Orthostatics in office are negative with no increase in HR with standing.   His most recent echo showed a hyperdynamic LVEF (underfilled) and he is on two diuretics and two antihypertensives. Sinus bradycardia improved with dc of beta blocker.  His GFR is around 30 and he was on nevin 25mg daily, decreased at last visit to 12.5mg eod.   \par \par P:\par - Since discontinuing lasix and metoprolol, and decreasing nevin to 12.5mg eod BP is controlled and HRs have improved avg mid 50s on holter.  No significant change in his orthostatism.   Check BMP today.  Counseled on LE compression \par - 72 hr holter monitor off BB with no arrhythmias, sinus rhythm avg HR 56bpm.  Continue to hold metoprolol. \par - continue crestor 5mg \par - pending [facility] records from aortic repair \par - completed radiation therapy for lung ca, follow up PET scan pending

## 2023-01-19 NOTE — HISTORY OF PRESENT ILLNESS
[FreeTextEntry1] : 72M hypertension, dyslipidemia, heavy smoker quit 2021, long-term alcohol abuse quit 2021, liver cirrhosis,  GI bleed in 2017 with large duodenal ulcer treated with embolization of gastroduodenal artery, CKD, anemia Hb 9.7, emergent descending thoracic aortic aneurysm with contained rupture s/p repair via left thoracotomy May 2021 at [facility], [facility], with newly diagnosed RUL adenocarcinoma, consideration for a robotic RUL lobectomy with decision now for radiation therapy.  \par \par Patient referred in part due to sinus bradycardia.  He is on metoprolol.   Started post aortic surgery. \par No cp, sob, palps, orthopnea, leg edema. \par Has chronic dizziness many years.  No syncopal episodes.  Symptoms are usually related to changes in posture. \par Accompanied by wife.  They are unsure of coronary workup prior to his aortic aneurysm repair. \par His physical activity is limited due to post herpetic neuralgia in legs.  Had shingles 5 yrs ago and has had chronic pain since then.\par \par Chest CT in August 2022 with normal sized descending thoracic aorta graft, ascending aorta stable at 3.6cm\par Echocardiogram in Dec 2021: hyperdynamic LV, no obstruction, no significant valve disease, tds\par \par 1/19/23:  returns post testing,  home BP logs in normal range.  Chronic dizziness no significant change, mostly upon standing.  No syncope.  Neg orthostatics.

## 2023-01-27 NOTE — END OF VISIT
[Time Spent: ___ minutes] : I have spent [unfilled] minutes of time on the encounter. Griseofulvin Counseling:  I discussed with the patient the risks of griseofulvin including but not limited to photosensitivity, cytopenia, liver damage, nausea/vomiting and severe allergy.  The patient understands that this medication is best absorbed when taken with a fatty meal (e.g., ice cream or french fries).

## 2023-02-03 ENCOUNTER — RESULT REVIEW (OUTPATIENT)
Age: 73
End: 2023-02-03

## 2023-02-03 ENCOUNTER — APPOINTMENT (OUTPATIENT)
Dept: HEMATOLOGY ONCOLOGY | Facility: CLINIC | Age: 73
End: 2023-02-03
Payer: MEDICARE

## 2023-02-03 VITALS
OXYGEN SATURATION: 97 % | BODY MASS INDEX: 25.32 KG/M2 | DIASTOLIC BLOOD PRESSURE: 65 MMHG | RESPIRATION RATE: 16 BRPM | TEMPERATURE: 97 F | WEIGHT: 161.31 LBS | SYSTOLIC BLOOD PRESSURE: 150 MMHG | HEIGHT: 67 IN | HEART RATE: 56 BPM

## 2023-02-03 DIAGNOSIS — K65.4 SCLEROSING MESENTERITIS: ICD-10-CM

## 2023-02-03 PROCEDURE — 99214 OFFICE O/P EST MOD 30 MIN: CPT | Mod: 25

## 2023-02-03 PROCEDURE — 36415 COLL VENOUS BLD VENIPUNCTURE: CPT

## 2023-02-03 NOTE — ASSESSMENT
[FreeTextEntry1] : #  Monoclonal Gammopathy- IgG Lambda\par - m spike unable to quantitate\par -anemia due to OLIVE\par - K:L ratio wnl\par -PET CT - no bone lesions\par - no hypercalcemia\par -CKD noted, no bence ledezma proteins\par \par #cirrhosis\par \par #CKD\par sees Dr. King \par \par # anemia - OLIVE \par needs iron supplementation\par \par #lung adenocarcinoma\par reviewed diagnosis\par reviewed PET CT\par MRI brain neg\par s/p SBRT\par follow up imaging per Dr. Carrillo\par PDL1 <1%\par \par #abd pain\par managed per Dr. Winn\par mesenteric adenopathy - will see if amenable to biopsy\par splenic vein thrombosis seen on MRI - will try to obtain scan for us to review. PET didn't mention\par \par #aortitis - DW Dr. Capone - no need for further procedure\par \par RTC in 4  months with cbc with diff, cmp, LDH, retic, ESR/CRP, myeloma labs and urine\par

## 2023-02-03 NOTE — HISTORY OF PRESENT ILLNESS
[de-identified] : Mr. Rojas is a 72 year old man who presents for initial consultation of MGUS.\par He was seen by Dr. Sanders for difficulty walking, foot pain, and nightly spasms.\par His foot pain started about 6 years ago and he was initially told he had shingles, but he was never treated for shingles.\par Blood work drawn revealed a weak IgG lambda band, M spike unable to quantitate\par He has ongoing anemia, but that could be due to his CKD for which he is followed by Dr. King\par \par He reports ongoing pain to his right foot which  has forced him to use a wheelchair.\par He reports ongoing feeling dizziness and lightheadedness\par He denies nausea, vomiting, constipation, diarrhea, and bleeding\par \par Health Maintenance:\par EGD 8/2022\par CNY about 5 years ago\par Former Smoker x 50 years, 1PPD at max, quit 18 months ago\par Quit drinking about 18 months ago, 5-6 whiskeys per night\par \par 3 children alive and well\par \par Retired  [de-identified] : Patient seen and examined and here today for follow up\par tolerated iron\par completed SBRT for lung cancer

## 2023-02-14 ENCOUNTER — APPOINTMENT (OUTPATIENT)
Dept: GASTROENTEROLOGY | Facility: CLINIC | Age: 73
End: 2023-02-14
Payer: MEDICARE

## 2023-02-14 VITALS
WEIGHT: 161 LBS | BODY MASS INDEX: 25.27 KG/M2 | HEIGHT: 67 IN | SYSTOLIC BLOOD PRESSURE: 110 MMHG | DIASTOLIC BLOOD PRESSURE: 60 MMHG

## 2023-02-14 PROCEDURE — 99214 OFFICE O/P EST MOD 30 MIN: CPT

## 2023-02-14 NOTE — PHYSICAL EXAM
[No Acute Distress] : no acute distress [No Masses] : no abdominal mass palpated [] : no hepatosplenomegaly [Rebound Tenderness] : rebound tenderness [Diffuse] : diffusely [Firm] : firm [Distended] : distended

## 2023-02-16 NOTE — ASSESSMENT
[FreeTextEntry1] : patient's exam is suggestive of peritoneal irritation, and i am not surprised by his chronic abdominal pain\par \par he feels it started several mos after his aneurysm surgery, but i cant really understan the connection\par \par i wonder about having a diagnositic laporoscopy with dr Carroll\par \par to look for some peritoneal disease..its hard to believe this exam is normal for him, and i am suspicious of some type of peritoneal disease,  \par \par have discussed this with Dr Carroll perhaps neoplastic or tbc

## 2023-02-16 NOTE — HISTORY OF PRESENT ILLNESS
[FreeTextEntry1] : in person visit\par patient and wife;\par \par since last visit, patient has been diagnosed with lung cancer first suspected on PET scan, and now being treated with radiation\par \par he continues to have abdominal distention...always looks like he has ascites on exam, but no on multiple imaging studies.\par \par patient complains of abdominal pain when he moves around, goes over a bump in his car, changes position, or laughs or coughs..

## 2023-02-18 LAB — PANCREATIC ELASTASE, FECAL: >500 CD:794062645

## 2023-02-23 ENCOUNTER — NON-APPOINTMENT (OUTPATIENT)
Age: 73
End: 2023-02-23

## 2023-02-23 LAB — CALPROTECTIN FECAL: 90 UG/G

## 2023-02-26 ENCOUNTER — RESULT REVIEW (OUTPATIENT)
Age: 73
End: 2023-02-26

## 2023-02-27 ENCOUNTER — APPOINTMENT (OUTPATIENT)
Dept: RADIATION ONCOLOGY | Facility: CLINIC | Age: 73
End: 2023-02-27
Payer: MEDICARE

## 2023-02-27 PROCEDURE — 99024 POSTOP FOLLOW-UP VISIT: CPT

## 2023-02-28 VITALS
RESPIRATION RATE: 18 BRPM | DIASTOLIC BLOOD PRESSURE: 68 MMHG | SYSTOLIC BLOOD PRESSURE: 112 MMHG | HEART RATE: 59 BPM | OXYGEN SATURATION: 98 %

## 2023-03-07 ENCOUNTER — RESULT REVIEW (OUTPATIENT)
Age: 73
End: 2023-03-07

## 2023-03-16 ENCOUNTER — NON-APPOINTMENT (OUTPATIENT)
Age: 73
End: 2023-03-16

## 2023-03-22 ENCOUNTER — APPOINTMENT (OUTPATIENT)
Dept: COLORECTAL SURGERY | Facility: CLINIC | Age: 73
End: 2023-03-22
Payer: MEDICARE

## 2023-03-22 VITALS
HEIGHT: 67 IN | HEART RATE: 58 BPM | DIASTOLIC BLOOD PRESSURE: 64 MMHG | BODY MASS INDEX: 25.11 KG/M2 | WEIGHT: 160 LBS | SYSTOLIC BLOOD PRESSURE: 132 MMHG

## 2023-03-22 PROCEDURE — 99214 OFFICE O/P EST MOD 30 MIN: CPT

## 2023-03-22 RX ORDER — OMEPRAZOLE 20 MG/1
20 CAPSULE, DELAYED RELEASE ORAL
Qty: 60 | Refills: 1 | Status: COMPLETED | COMMUNITY
End: 2023-03-22

## 2023-03-22 NOTE — HISTORY OF PRESENT ILLNESS
[FreeTextEntry1] : 71 year old male pt with stage 2 T2NO RUL adenocarcinoma\par \par He has been referred to us for a diagnostic laparoscopy w/ possible biopsies.\par \par Last seen on 12/22/22 for possible anal canal malignancy as per Pet Scan on 10/16/22 \par \par MRI 1/18/23:\par previously noted FDG uptake corresponds to small area of enhancement T2 signal abnormality, favor chronic fibrotic/ inflammatory process given stability dating back to 12/16/2016\par \par Pet Scan 3/7:\par 1. Since 10/26/22 no significant change in the hypermetabolic 12 mm nodule at the paravertebral posterior right upper lobe consistent with known malignancy. No definite evidence of FDG avid metastatic disease\par 2. No significant change in multiple areas of intense FDG uptake within the wall of the thoracic aorta consistent with aortitis . Recommend CTA chest if not already performed to evaluate for ulceration\par 3. Findings consistent with persistent mesenteric panniculitis\par \par Pt still complains of abdominal pain\par Denies nausea or vomiting\par Moving his bowels every day, stools are soft, taking 1 colace every day\par Denies bleeding\par Appetite is good, maintaining his weight\par Here for follow up

## 2023-03-22 NOTE — ASSESSMENT
[FreeTextEntry1] : 72 M referred to us for diagnostic laparoscopy and possible biopsies.\par Plan:\par Will need medical clearance for surgery.\par Discussed diagnostic laparoscopy and biopsies; also discussed risks and possible complications.\par All questions answered.

## 2023-03-22 NOTE — PHYSICAL EXAM
[Abdomen Masses] : No abdominal masses [Abdomen Tenderness] : ~T No ~M abdominal tenderness [No HSM] : no hepatosplenomegaly [Exam Deferred] : exam was deferred [No Rash or Lesion] : No rash or lesion [Alert] : alert [Oriented to Person] : oriented to person [Oriented to Place] : oriented to place [Oriented to Time] : oriented to time [Calm] : calm [de-identified] : Soft; distended; possibly due to ascites [de-identified] : Normal [de-identified] : Normal [de-identified] : Normal [de-identified] : Normal [de-identified] : Normal

## 2023-03-22 NOTE — REVIEW OF SYSTEMS
[As Noted in HPI] : as noted in HPI [Negative] : Heme/Lymph [FreeTextEntry9] : Walks very slow uses a walker and wheelchair

## 2023-04-30 ENCOUNTER — RESULT REVIEW (OUTPATIENT)
Age: 73
End: 2023-04-30

## 2023-05-01 ENCOUNTER — APPOINTMENT (OUTPATIENT)
Dept: COLORECTAL SURGERY | Facility: HOSPITAL | Age: 73
End: 2023-05-01
Payer: MEDICARE

## 2023-05-01 ENCOUNTER — RESULT REVIEW (OUTPATIENT)
Age: 73
End: 2023-05-01

## 2023-05-01 PROCEDURE — ZZZZZ: CPT

## 2023-05-02 ENCOUNTER — NON-APPOINTMENT (OUTPATIENT)
Age: 73
End: 2023-05-02

## 2023-05-03 ENCOUNTER — NON-APPOINTMENT (OUTPATIENT)
Age: 73
End: 2023-05-03

## 2023-05-17 ENCOUNTER — APPOINTMENT (OUTPATIENT)
Dept: COLORECTAL SURGERY | Facility: CLINIC | Age: 73
End: 2023-05-17
Payer: MEDICARE

## 2023-05-17 VITALS
BODY MASS INDEX: 25.11 KG/M2 | HEART RATE: 63 BPM | WEIGHT: 160 LBS | SYSTOLIC BLOOD PRESSURE: 136 MMHG | DIASTOLIC BLOOD PRESSURE: 64 MMHG | HEIGHT: 67 IN

## 2023-05-17 PROCEDURE — 99024 POSTOP FOLLOW-UP VISIT: CPT

## 2023-05-17 NOTE — PHYSICAL EXAM
[Abdomen Masses] : No abdominal masses [Abdomen Tenderness] : ~T No ~M abdominal tenderness [No HSM] : no hepatosplenomegaly [Exam Deferred] : exam was deferred [No Rash or Lesion] : No rash or lesion [Alert] : alert [Oriented to Person] : oriented to person [Oriented to Place] : oriented to place [Oriented to Time] : oriented to time [Calm] : calm [de-identified] : Distended; incisions well healed [de-identified] : Normal [de-identified] : Normal [de-identified] : Normal [de-identified] : Normal [de-identified] : Normal

## 2023-05-17 NOTE — HISTORY OF PRESENT ILLNESS
[FreeTextEntry1] : 72 year old male pt s/p laparoscopy, peritoneal biopsy, collection of ascitic fluid sample on 5/1/23\par \par Pathology:\par Fibroadipose  tissue with vessels showing mild chronic inflammation\par \par Here for follow up\par \par Pt reports that he still has abdominal pain all over his abdomen \par Nothing makes the pain better or worse\par Moving his bowels regularly , denies constipation, taking stool softeners\par Denies bleeding\par Good appetite\par Denies nausea or vomiting\par Denies fevers or chills\par \par Last colonoscopy was 1/21/21- internal hemorrhoids and diverticulosis\par

## 2023-05-17 NOTE — ASSESSMENT
[FreeTextEntry1] : 72 M here for post-op visit. The laparoscopic incision sites have healed. He continue to have abdominal pain/discomfort - which is likely on account of the ascites. He has cirrhosis of the liver.\par Plan:\par Discussed laparoscopic findings as well as the pathology+cytology+culture results with them.\par Refer back to Dr Winn/hepatology for management of the ascites and Cirrhosis.\par See again as needed.\par All questions answered.

## 2023-05-18 ENCOUNTER — APPOINTMENT (OUTPATIENT)
Dept: RADIATION ONCOLOGY | Facility: CLINIC | Age: 73
End: 2023-05-18
Payer: MEDICARE

## 2023-05-18 ENCOUNTER — RX RENEWAL (OUTPATIENT)
Age: 73
End: 2023-05-18

## 2023-05-18 VITALS
SYSTOLIC BLOOD PRESSURE: 164 MMHG | DIASTOLIC BLOOD PRESSURE: 76 MMHG | OXYGEN SATURATION: 98 % | HEART RATE: 68 BPM | RESPIRATION RATE: 18 BRPM

## 2023-05-18 PROCEDURE — 99214 OFFICE O/P EST MOD 30 MIN: CPT

## 2023-05-22 DIAGNOSIS — K63.89 OTHER SPECIFIED DISEASES OF INTESTINE: ICD-10-CM

## 2023-05-23 NOTE — HISTORY OF PRESENT ILLNESS
[FreeTextEntry1] : Mr. Rojas is a 72 year old man, who was diagnosed with Stage II T2N0 adenocarcinoma of the right upper lobe, he is s/p SBRT to the right upper lobe. He is present today for his routine follow-up. \par \par Mr. Rojas is under the care of Dr. Herman for MGUS and he underwent a PET CT evaluation for MGUS on 10/26/22 which demonstrated:\par 1. 13 mm hypermetabolic nodule at the paravertebral posterior right upper lobe, SUV max 5.4, probably representing malignancy. \par  2. 2.4 cm lenticular pleural-based opacity at the left midlung with mild FDG uptake, indeterminate in nature. Metastatic disease could not be excluded.  \par  3. Focal uptake at the anterior anal canal, SUV max 12.2, possibly representing malignancy. Direct visualization and tissue typing was recommended.  \par  4. Short segments of intense FDG uptake in the wall of the mid and distal descending thoracic aorta consistent with aortitis. Further evaluation with CT angiogram was recommended to evaluate for ulceration.   \par  5. 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 Malignant involvement could not be entirely excluded.  \par \par On 11/14/22, Mr. Rojas underwent a CT guided right upper lobe lung biopsy and pathology revealed lung tissue showing a small area of fibroelastic scar with a few highly atypical epithelial cells consistent with adenocarcinoma.  \par \par Mr. Rojas reports no shortness of breath, no cough, no hemoptysis. He reported his appetite was good and his weight has been stable. He denies any new back or bone pain. It should be noted that Mr. Rojas underwent a left thoracotomy for an emergent descending thoracic aortic aneurysm repair in 5/21 at [facility]. \par \par Mr. Rojas received SBRT to the right lung, which he completed on 1/16/23 to 50 Gy.\par Mr. Rojas is present for his routine follow-up s/p radiation therapy completed 4 months ago. Follow-up PET/CT (3/7/23) revealed no significant change in the hypermetabolic 12 mm (SUV max 4.7, previously 5.4) nodule at the paravertebral posterior right upper lobe consistent with known malignancy. No definite evidence of FDG avid metastatic disease.  2. No significant change in multiple areas of intense FDG uptake within the wall of the thoracic aorta consistent with aortitis. He reports no new symptoms at this time. Denies any cough, SOB, hemoptysis, or discomfort. He reports his weight is stable and appetite is good. Overall, he is doing well. \par

## 2023-05-23 NOTE — DISEASE MANAGEMENT
[Clinical] : TNM Stage: c [I] : I [FreeTextEntry4] : Adenocarcinoma of the right upper lobe [TTNM] : 2 [NTNM] : 0 [MTNM] : 0 [de-identified] : 3000 cGy [de-identified] : 5000 cGy [de-identified] : completed 1/16/23

## 2023-05-23 NOTE — ASSESSMENT
[Work-up necessary to assess local, regional or metastatic recurrence] : Work-up necessary to assess local, regional or metastatic recurrence [FreeTextEntry1] : no clinical symptoms

## 2023-05-24 NOTE — DISEASE MANAGEMENT
[Clinical] : TNM Stage: c [I] : I [FreeTextEntry4] : Adenocarcinoma of the right upper lobe [TTNM] : 2 [NTNM] : 0 [MTNM] : 0 [de-identified] : completed 1/16/23

## 2023-05-24 NOTE — HISTORY OF PRESENT ILLNESS
[FreeTextEntry1] : Mr. Rojas is a 72 year old man, who was diagnosed with Stage II T2N0 adenocarcinoma of the right upper lobe, completed SBRT to the lung. \par \par Mr. Rojas is under the care of Dr. Herman for MGUS and he underwent a PET CT evaluation for MGUS on 10/26/22 which demonstrated:\par 1. 13 mm hypermetabolic nodule at the paravertebral posterior right upper lobe, SUV max 5.4, probably representing malignancy. \par  2. 2.4 cm lenticular pleural-based opacity at the left midlung with mild FDG uptake, indeterminate in nature. Metastatic disease could not be excluded.  \par  3. Focal uptake at the anterior anal canal, SUV max 12.2, possibly representing malignancy. Direct visualization and tissue typing was recommended.  \par  4. Short segments of intense FDG uptake in the wall of the mid and distal descending thoracic aorta consistent with aortitis. Further evaluation with CT angiogram was recommended to evaluate for ulceration.   \par  5. 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 Malignant involvement could not be entirely excluded.  \par \par On 11/14/22, Mr. Rojas underwent a CT guided right upper lobe lung biopsy and pathology revealed lung tissue showing a small area of fibroelastic scar with a few highly atypical epithelial cells consistent with adenocarcinoma.  \par \par Mr. Rojas reports no shortness of breath, no cough, no hemoptysis. He reported his appetite was good and his weight has been stable. He denies any new back or bone pain. It should be noted that Mr. Rojas underwent a left thoracotomy for an emergent descending thoracic aortic aneurysm repair in 5/21 at [facility]. \par \par Social History:\par Former Smoker x 50 years, 1-2 PPD at max, quit 18 months ago\par Quit drinking about 18 months ago, 5-6 whiskey per night. \par \par Family Hx: father, believed to have had cancer but unsure, lived in [place]. \par \par 2/27/23\par Mr. Rojas presents today for PTE. He reports no new symptoms at this time. He received SBRT to the right lung, which he completed on 1/16/23 to 50 Gy. He denies any SOB, cough, loss of appetite, or weight loss. He had follow-up with Dr. Herman. He is also under the care of Dr. Winn regarding chronic abdominal discomfort. He is otherwise doing well. Mr. Rojas is present today for his routine post treatment evaluation.

## 2023-06-16 ENCOUNTER — RESULT REVIEW (OUTPATIENT)
Age: 73
End: 2023-06-16

## 2023-06-16 ENCOUNTER — APPOINTMENT (OUTPATIENT)
Dept: HEMATOLOGY ONCOLOGY | Facility: CLINIC | Age: 73
End: 2023-06-16

## 2023-06-16 VITALS
HEIGHT: 67 IN | SYSTOLIC BLOOD PRESSURE: 140 MMHG | HEART RATE: 53 BPM | DIASTOLIC BLOOD PRESSURE: 59 MMHG | RESPIRATION RATE: 16 BRPM | OXYGEN SATURATION: 91 % | BODY MASS INDEX: 25.27 KG/M2 | TEMPERATURE: 97.5 F | WEIGHT: 161 LBS

## 2023-06-23 ENCOUNTER — APPOINTMENT (OUTPATIENT)
Dept: HEMATOLOGY ONCOLOGY | Facility: CLINIC | Age: 73
End: 2023-06-23
Payer: MEDICARE

## 2023-06-23 ENCOUNTER — RESULT REVIEW (OUTPATIENT)
Age: 73
End: 2023-06-23

## 2023-06-23 VITALS
HEART RATE: 58 BPM | OXYGEN SATURATION: 95 % | TEMPERATURE: 97.7 F | DIASTOLIC BLOOD PRESSURE: 76 MMHG | RESPIRATION RATE: 16 BRPM | SYSTOLIC BLOOD PRESSURE: 145 MMHG

## 2023-06-23 PROCEDURE — 99214 OFFICE O/P EST MOD 30 MIN: CPT

## 2023-07-16 NOTE — REVIEW OF SYSTEMS
[Negative] : Allergic/Immunologic [Fever] : no fever [Chills] : no chills [Night Sweats] : no night sweats [Fatigue] : fatigue [Recent Change In Weight] : ~T no recent weight change [Abdominal Pain] : abdominal pain [Dizziness] : dizziness [FreeTextEntry7] : +abd bloating

## 2023-07-16 NOTE — ASSESSMENT
[FreeTextEntry1] : Mr. Jeffries is a 72 year old male that presents for follow up for the management of MGUS and lung cancer. \par \par #MGUS \par -  IgG Lambda\par - M spike unable to quantitate\par - Anemia due to OLIVE\par - K:L ratio wnl\par - PET/CT without bone lesions\par - No hypercalcemia\par - CKD noted, no Bence Ledezma proteins\par - 6/23/23 vs and CBC reviewed; 4.33, hgb 13.7, plt 202. Flow added today. Reviewed myeloma labs from 6/16/23\par UPEP without monoclonal band, bence ledezma absent, SPEP mild polyclonal gammopathy, M spike was not reported,  K:L 1.28, beta 2 4.5. \par \par #Liver Cirrhosis \par - Followed by Dr. Winn and Dr. Carroll \par - 5/1/23 s/p laparoscopy and peritoneal bx pathology revealing fibroadipose tissue with vessels showing mild chronic inflammation. Peritoneal fluid negative for malignancy mesothelial cells and histiocytes present. He was seen in follow up by Dr. Carroll with note of referral back to GI Dr. Winn for management of abd s/s and ascites\par - Encouraged GI follow up with Dr. Saucedo \par - 6/23/23 mild abd distension on exam and note of abd bloating. Will add CT A/P has he is due for CT Chest ordered by Dr. Cardoza for management of lung cancer \par \par #CKD\par - 6/23/23 continue follow up with Nephrology Dr. King Cr 1.7 stable \par \par #OLIVE \par - s/p iron supplementation \par - 6/23/23 remains anemic hgb 13.7. Repeat irons sent \par \par #Lung Adenocarcinoma\par - s/p PET/CT 10/2022 as work up for MGUS revealing 13 mm hypermetabolic nodule at the paravertebral posterior right upper lobe, SUV max 5.4, probably representing malignancy. 2.4 cm lenticular pleural-based opacity at the left midlung with mild FDG uptake, indeterminate in nature. Metastatic disease could not be excluded. Focal uptake at the anterior anal canal, SUV max 12.2, possibly representing malignancy. Direct visualization and tissue typing was recommended. Short segments of intense FDG uptake in the wall of the mid and distal descending thoracic aorta consistent with aortitis. Further evaluation with CT angiogram was recommended to evaluate for ulceration.  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 Malignant involvement could not be entirely excluded. \par - 11/14/22 s/p CT guided right upper lobe lung biopsy and pathology revealed lung tissue showing a small area of fibroelastic scar with a few highly atypical epithelial cells consistent with adenocarcinoma. \par - Reviewed diagnosis\par - PDL1 <1%\par - Reviewed PET/CT\par - MRI Brain negative \par - s/p SBRT to the right lung completed on 1/16/23 to 50 Gy\par - 3/2023 s/p PET/CT revealing no significant change in the hypermetabolic 12 mm (SUV max 4.7, previously 5.4) nodule at the paravertebral posterior right upper lobe consistent with known malignancy. No definite evidence of FDG avid metastatic disease. 2. No significant change in multiple areas of intense FDG uptake within the wall of the thoracic aorta consistent with aortitis. \par - Continue surveillance imaging per Dr. aCrrillo. Ordered for repeat CT Chest. Patient has not done yet \par \par #Abd Pain/Bloating \par - As above managed per Dr. Winn and Dr. Carroll \par - Splenic vein thrombosis seen on MRI. Will try to obtain scan for us to review. PET didn't mention\par - s/p laparoscopy and peritoneal bx pathology revealing fibroadipose tissue with vessels showing mild chronic inflammation. Peritoneal fluid negative for malignancy mesothelial cells and histiocytes present. He was seen in follow up by Dr. Carroll with note of referral back to GI Dr. Winn for management of abd s/s and ascites\par \par #Aortitis \par - Dw Dr. Capone no need for further procedure\par \par #Neuropathic Pain \par - Patient reports a hx of shingles that affected R leg about 5 years ago still causing some neuropathy \par - Check B12/folate\par - Recommend alpha lipoic acid \par - PCP for hgb A1C\par  \par RTC in 4 mos with CBC with diff, CMP, LDH, retic, ESR/CRP, myeloma labs and urine\par \par Case and management discussed with Dr. Herman \par

## 2023-07-16 NOTE — PHYSICAL EXAM
[Ambulatory and capable of all self care but unable to carry out any work activities] : Status 2- Ambulatory and capable of all self care but unable to carry out any work activities. Up and about more than 50% of waking hours [Normal] : affect appropriate [de-identified] : mild abd bloating

## 2023-07-16 NOTE — HISTORY OF PRESENT ILLNESS
[de-identified] : Mr. Rojas is a 72 year old man who presents for initial consultation of MGUS.\par He was seen by Dr. Sanders for difficulty walking, foot pain, and nightly spasms.\par His foot pain started about 6 years ago and he was initially told he had shingles, but he was never treated for shingles.\par Blood work drawn revealed a weak IgG lambda band, M spike unable to quantitate\par He has ongoing anemia, but that could be due to his CKD for which he is followed by Dr. King\par \par He reports ongoing pain to his right foot which  has forced him to use a wheelchair.\par He reports ongoing feeling dizziness and lightheadedness\par He denies nausea, vomiting, constipation, diarrhea, and bleeding\par \par Health Maintenance:\par EGD 8/2022\par [facility] about 5 years ago\par Former Smoker x 50 years, 1PPD at max, quit 18 months ago\par Quit drinking about 18 months ago, 5-6 whiskeys per night\par \par 3 children alive and well\par \par Retired  [de-identified] : Patient seen and examined and here today for follow up for the management of MGUS and lung cancer on surveillance. He completed SBRT with Dr. Carrillo. Due for repeat CT chest imaging now. He reports feeling okay but notes feeling tired with intermittent dizziness. He reports a good appetite. He endorses continued abdominal discomfort and bloating. He is seen by Dr. Winn GI and Dr. Carroll for ascites associated with liver cirrhosis. He is s/p laparoscopy and peritoneal bx pathology revealing fibroadipose tissue with vessels showing mild chronic inflammation. Peritoneal fluid negative for malignancy mesothelial cells and histiocytes present. He was seen in follow up by Dr. Carroll with note of referral back to GI Dr. Winn for management of abd s/s and ascites.  He endorses some neuropathy to R foot and notes a hx of shingles about 5 years ago which affected his foot. Denies fevers/chills, HA, dizziness, SOB, cough, CP, palpitations, abd pain, n/v/d, swelling to extremities, back pain, hematuria, BRBPR. \par \par

## 2023-07-19 ENCOUNTER — APPOINTMENT (OUTPATIENT)
Dept: GASTROENTEROLOGY | Facility: CLINIC | Age: 73
End: 2023-07-19
Payer: MEDICARE

## 2023-07-19 VITALS
HEIGHT: 67 IN | HEART RATE: 50 BPM | RESPIRATION RATE: 16 BRPM | BODY MASS INDEX: 25.11 KG/M2 | WEIGHT: 160 LBS | DIASTOLIC BLOOD PRESSURE: 58 MMHG | OXYGEN SATURATION: 95 % | SYSTOLIC BLOOD PRESSURE: 130 MMHG

## 2023-07-19 PROCEDURE — 99214 OFFICE O/P EST MOD 30 MIN: CPT

## 2023-07-19 RX ORDER — PREGABALIN 25 MG/1
25 CAPSULE ORAL
Qty: 90 | Refills: 2 | Status: DISCONTINUED | COMMUNITY
Start: 2022-09-06 | End: 2023-07-19

## 2023-07-19 NOTE — ASSESSMENT
[FreeTextEntry1] : chronic pain\par suspicion of ascites\par ascites found with laporoscopy\par would like to quantitate with abdominal ultrasound\par \par and as soon as ultrasound completed,\par would like to begin or increase diuretic\par \par he is now on spironolactone 12.5 mg every other day;  this would be homeopathic dose for ascities

## 2023-07-19 NOTE — HISTORY OF PRESENT ILLNESS
[FreeTextEntry1] : fu established condition, chronic abdominal pain\par known cirrhosis, unknown etiology, possibly laennecs\par status post repair of thoracid aneurysm\par \par chronic abdominal pain, has never eased up\par status post exploratory laporoscopy to check for peritonaeal disease, not found\par \par we still have patient with ongoing abdominal pain\par \par Dr Perry surgery, laporoscopy, May 1\par moderate ascites\par \par questionable peritoneal inflamm but biopsies were negative\par \par

## 2023-07-20 ENCOUNTER — RESULT REVIEW (OUTPATIENT)
Age: 73
End: 2023-07-20

## 2023-07-26 ENCOUNTER — RESULT REVIEW (OUTPATIENT)
Age: 73
End: 2023-07-26

## 2023-08-09 ENCOUNTER — APPOINTMENT (OUTPATIENT)
Dept: PAIN MANAGEMENT | Facility: CLINIC | Age: 73
End: 2023-08-09
Payer: MEDICARE

## 2023-08-09 VITALS
SYSTOLIC BLOOD PRESSURE: 150 MMHG | BODY MASS INDEX: 25.11 KG/M2 | HEIGHT: 67 IN | WEIGHT: 160 LBS | DIASTOLIC BLOOD PRESSURE: 60 MMHG

## 2023-08-09 PROCEDURE — 99214 OFFICE O/P EST MOD 30 MIN: CPT

## 2023-08-09 NOTE — PHYSICAL EXAM
[Normal muscle bulk without asymmetry] : normal muscle bulk without asymmetry [Wheelchair] : uses a wheelchair [Normal] : Normal affect [Spinous Process Tenderness] : no spinous process tenderness

## 2023-08-09 NOTE — ASSESSMENT
[FreeTextEntry1] : >> Imaging and Other Studies  I personally reviewed the relevant imaging.  Discussed and explained to patient the likely source of pathology and pain.  Questions answered. CT  back and leg pain likely secondary to lumbar radiculopathy and discogenic pain refractory to conservative treatments including 6 consecutive weeks of home exercises/PT, will obtain MRI LS to evaluate for pathology (not candidate for IVC given CRF, low suspicion of malignancy related pain given multiple imaging  may consider PT vs intervention pending eval >> Therapy and Other Modalities  consider restart PT   >> Medications   not candidate for medication at this time  >> Interventions  na  >> Consults  >> Discussion of Risks/Benefits/Alternatives  	>Regarding any scheduled procedures:  I have discussed in detail with the patient that any interventional pain procedure is associated with potential risks.  The procedure may include an injection of steroids and potentially other medications (local anesthetic and normal saline) into the epidural space or surrounding tissue of the spine.  There are significant risks of this procedure which include and are not limited to infection, bleeding, worsening pain, dural puncture leading to postdural puncture headache, nerve damage, spinal cord injury, paralysis, stroke, and death.    There is a chance that the procedure does not improve their pain.    There are risks associated with the steroid being absorbed into the body systemically.  These include dysphoria, difficulty sleeping, mood swings and personality changes.  Premenopausal women may notice an irregularity in her menstrual cycle for 2-3 months following the injection.  Steroids can specifically affect patients with hypertension, diabetes, and peptic ulcers.  The procedure may cause a temporary increase in blood pressure and blood pressure, and may adversely affect a peptic ulcer.  Other, more rare complications, include avascular necrosis of joints, glaucoma and worsening of osteoporosis.   I have discussed the risks of the procedure at length with the patient, and the potential benefits of pain relief.  I have offered alternatives to the procedure.  All questions were answered.    The patient expressed understanding and wishes to proceed with the procedure.  	>Regarding COVID19 Pandemic:   Any planned interventional pain procedure are scheduled because further delay may cause harm or negative outcome to patient.  The goal in performing this procedure is to avoid deterioration of function, emergency room visits (which increases exposure) and reliance on opioids.    r/b/a discussed with patient, lack of evidence to conclusively determine whether pain management procedures have any positive or negative impact on the possibility of amisha the virus and/or development of any sequelae.   Patient counselled regarding timing steroid based intervention 2 weeks before or after COVID-19 vaccine administration to avoid any interaction or affect on efficacy of vaccination  Patient demonstrates understanding  Informed patient that risks associated with the COVID-19 infection.  Informed patient steps taken to limit the risks.  We are implementing safety precautions and following protocols consistent with the CDC and state recommendations. All patients and staff will be checked for fever or signs of illness upon entry to the facility. We will limit our steroid dose to the lowest effective therapeutic dose or in some cases steroids will not be injected at all.   Patient agrees to proceed  >> Conclusion  The above diagnosis and treatment plan is medically reasonable and necessary based on the patient encounter  There were no barriers to communication. Informed patient that I would be available for any additional questions. Patient was instructed to call with any worsening symptoms including severe pain, new numbness/weakness, or changes in the bowel/bladder function.  Discussed role of nsaids in pain management and all relevant risks, if patient is continuing to require after 4 weeks the patient should f/u for alternative treatment.  Instructed patient to maintain pain diary to monitor pain level, mobility, and function.  The referring provider was informed of the above diagnosis and treatment plan.

## 2023-08-09 NOTE — HISTORY OF PRESENT ILLNESS
[Joint Pain] : joint  [___ yrs] : [unfilled] year(s) ago [Constant] : constant [8] : a maximum pain level of 8/10 [Aching] : aching [Throbbing] : throbbing [Standing] : standing [Walking] : walking [Sitting] : sitting [FreeTextEntry1] : HPI     Mr. JUSTYN MORALES is a 73 year M with pmhx of Adenocarcinoma of the right lung (s/p 5 sessions RT Feb 2023), liver cirrhosis (f/u with his specialist in Sept), Ascites, CKD3, HTN, Chronic pancreatitis, MGUS, Abdominal Aortic Aneurysm Repair, PSVT. Recent PET scan with increase in right lung cancer. Pending appointment with oncologist August 30th. C/o pain to bilateral legs, right greater left legs and feet. Pain began 6 year ago. Pain is constant. Worst with walking/standing. Improved by sitting. Denies any additional weakness, numbness, bowel/bladder dysfunction.    Previous and current pain medications/doses/effects:   gabapentin without improvement  Previous Pain Treatments:   PT without improvement  Previous Pain Injections:   n/a  Previous Diagnostic Studies/Images:   CT Chest/ Abd  LUNGS AND LARGE AIRWAYS: Increased size of a previously noted right upper lobe paravertebral nodule, measuring 17 mm, compared to 12 mm previously. In addition, increased hazy lung density is seen surrounding this lesion, increased in extent. No other interval change. No new pulmonary nodules. Minimal focal atelectasis seen in the medial and left upper lobe medially. PLEURA: No pleural effusion. VESSELS: Extensive aortic atherosclerotic wall calcification throughout the aorta. No aortic aneurysm. No pulmonary embolism. HEART: Heart size is normal. No pericardial effusion. MEDIASTINUM AND JESSICA: No lymphadenopathy. CHEST WALL AND LOWER NECK: Within normal limits.  ABDOMEN AND PELVIS: LIVER: Nodular contour, compatible with cirrhotic change. This has not significantly changed. No hepatic lesions. The hepatic vessels are patent. BILE DUCTS: Normal caliber. GALLBLADDER: Edema of the gallbladder wall, nonspecific in the presence of mild ascites. SPLEEN: Within normal limits. PANCREAS: Radiopaque lucency noted adjacent to the pancreatic head ADRENALS: Within normal limits. KIDNEYS/URETERS: Symmetric renal enhancement. No collecting system obstruction bilaterally.  BLADDER: Within normal limits. REPRODUCTIVE ORGANS: Prostate within normal limits.  BOWEL: No bowel obstruction. PERITONEUM: Diffuse peritoneal stranding, compatible with mild inflammation/infiltration. Minimal ascites. VESSELS: No abdominal aortic aneurysm or dissection. Extensive infrarenal abdominal aortic atherosclerotic calcification. Severe atherosclerotic calcification noted in the common iliac arteries, internal and external iliac arteries, and common femoral arteries bilaterally. RETROPERITONEUM/LYMPH NODES: No lymphadenopathy. ABDOMINAL WALL: Fat-containing left inguinal hernia BONES: Degenerative changes.   IMPRESSION: 1. Increased size of solitary nodule noted previously in the right upper lobe, compatible with neoplasm. Increased infiltrative change seen in the adjacent lung tissue. 2. No new pulmonary nodules. 3. Cirrhotic changes in liver. 4. Trace ascites. 5. Findings of peritoneal stranding, compatible with mild inflammation/infiltration. 6. No thoracoabdominal aortic dissection or aneurysm. 7. Extensive calcified atherosclerotic disease throughout the arterial tree.    [FreeTextEntry7] : Pain right and left legs and feet .

## 2023-08-09 NOTE — REVIEW OF SYSTEMS
[Muscle Pain] : muscle pain [Radiating Pain] : radiating pain [Joint Pain] : joint pain [Negative] : Heme/Lymph

## 2023-08-21 ENCOUNTER — RESULT REVIEW (OUTPATIENT)
Age: 73
End: 2023-08-21

## 2023-08-24 ENCOUNTER — APPOINTMENT (OUTPATIENT)
Dept: RADIATION ONCOLOGY | Facility: CLINIC | Age: 73
End: 2023-08-24
Payer: MEDICARE

## 2023-08-24 VITALS
HEART RATE: 55 BPM | RESPIRATION RATE: 18 BRPM | SYSTOLIC BLOOD PRESSURE: 140 MMHG | DIASTOLIC BLOOD PRESSURE: 60 MMHG | OXYGEN SATURATION: 95 %

## 2023-08-24 VITALS — BODY MASS INDEX: 24.75 KG/M2 | WEIGHT: 158 LBS

## 2023-08-24 PROCEDURE — 99214 OFFICE O/P EST MOD 30 MIN: CPT

## 2023-08-25 ENCOUNTER — APPOINTMENT (OUTPATIENT)
Dept: PODIATRY | Facility: CLINIC | Age: 73
End: 2023-08-25

## 2023-08-29 NOTE — PHYSICAL EXAM
[Normal] : oriented to person, place and time, the affect was normal, the mood was normal and not anxious [de-identified] : extremely distended and tender to palpation

## 2023-08-29 NOTE — HISTORY OF PRESENT ILLNESS
[FreeTextEntry1] : Mr. Rojas is a 73-year-old male previously diagnosed with Stage II T2N0 adenocarcinoma of the right upper lobe. Mr. Rojas is under the care of Dr. Herman for [facility] and underwent a PET/CT evaluation on 10/26/22 which showed:  1. Hypermetabolic nodule at the paravertebral posterior right upper lobe, SUV max 5.4, probably representing malignancy.  2. 2.4 cm lenticular pleural-based opacity at the left midlung with mild FDG uptake, indeterminate in nature. Metastatic disease could not be excluded.  3. Focal uptake at the anterior anal canal, SUV max 12.2, possibly representing malignancy.  4. Short segments of intense FDG uptake in the wall of the mid and distal descending thoracic aorta consistent with aortitis.  5. 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   11/14/22 CT-guided biopsy: lung tissue showing a small area of fibroelastic scare with a few highly atypical epithelial cells consistent with adenocarcinoma.   Mr. Rojas received SBRT to the right lung in 5 fractions to a total dose of 5000 cGy completed on 1/18/23.  1/18/23 MRI Abdomen: Previously noted FDG uptake corresponds to a small area of enhancement/T2 signal abnormality centered in the perineum as detailed above. Favor chronic fibrotic/inflammatory process given stability dating back to 12/16/16. Prominent rectal mucosa, correlate for prostatitis.  3/15/23 PET: 1. Since 10/26/2022, no significant change in the hypermetabolic 12 mm (SUV max 4.7, previously 5.4) nodule at the paravertebral posterior right upper lobe consistent with known malignancy. No definite evidence of FDG avid metastatic disease.  2. No significant change in multiple areas of intense FDG uptake within the wall of the thoracic aorta consistent with aortitis. Recommend CTA chest if not already performed to evaluate for ulceration.  3. Findings consistent with persistent mesenteric panniculitis. 7/26/23 CT C/A/P:   1.  Increased size of solitary nodule noted previously in the right upper lobe, compatible with neoplasm. Increased infiltrative change seen in the adjacent lung tissue.  2.  No new pulmonary nodules.  3.  Cirrhotic changes in liver.  4.  Trace ascites.  5.  Findings of peritoneal stranding, compatible with mild inflammation/infiltration.  6.  No thoracoabdominal aortic dissection or aneurysm.  7.  Extensive calcified atherosclerotic disease throughout the arterial tree.  Mr. Rojas presents today for routine follow-up. He states that he has persistent bloating and early satiety, he is being evaluated by a liver specialist. He does report noticing an increase in mucous production recently. He denies cough, difficulty swallowing, or SOB. Mr. Rojas verbalized anxiety related to recent imaging - emotional support provided at this time.

## 2023-08-29 NOTE — ASSESSMENT
[Work-up necessary to assess local, regional or metastatic recurrence] : Work-up necessary to assess local, regional or metastatic recurrence [FreeTextEntry1] : Will reimage with PET CT

## 2023-08-30 ENCOUNTER — LABORATORY RESULT (OUTPATIENT)
Age: 73
End: 2023-08-30

## 2023-08-30 ENCOUNTER — APPOINTMENT (OUTPATIENT)
Dept: PAIN MANAGEMENT | Facility: CLINIC | Age: 73
End: 2023-08-30
Payer: MEDICARE

## 2023-08-30 VITALS
HEIGHT: 67 IN | BODY MASS INDEX: 24.8 KG/M2 | SYSTOLIC BLOOD PRESSURE: 161 MMHG | DIASTOLIC BLOOD PRESSURE: 74 MMHG | WEIGHT: 158 LBS

## 2023-08-30 PROCEDURE — 99214 OFFICE O/P EST MOD 30 MIN: CPT

## 2023-08-30 NOTE — PHYSICAL EXAM
[Normal muscle bulk without asymmetry] : normal muscle bulk without asymmetry [Facet Tenderness] : no facet tenderness [] : Motor: [Normal] : Normal affect

## 2023-08-30 NOTE — HISTORY OF PRESENT ILLNESS
[Joint Pain] : joint  [___ yrs] : [unfilled] year(s) ago [Constant] : constant [8] : a maximum pain level of 8/10 [Aching] : aching [Throbbing] : throbbing [Standing] : standing [Walking] : walking [Sitting] : sitting [FreeTextEntry1] : Interval Note:  Since last visit the pain is not improved.  Continues to have pain over bilateral legs and back.  Pain is so bad that patient finds it difficult to perform adls and ambulate. Denies any additional weakness, numbness, bowel/bladder dysfunction.  Pain intensity is   HPI     Mr. JUSTYN MORALES is a 73 year M with pmhx of Adenocarcinoma of the right lung (s/p 5 sessions RT Feb 2023), liver cirrhosis (f/u with his specialist in Sept), Ascites, CKD3, HTN, Chronic pancreatitis, MGUS, Abdominal Aortic Aneurysm Repair, PSVT. Recent PET scan with increase in right lung cancer. Pending appointment with oncologist August 30th. C/o pain to bilateral legs, right greater left legs and feet. Pain began 6 year ago. Pain is constant. Worst with walking/standing. Improved by sitting. Denies any additional weakness, numbness, bowel/bladder dysfunction.    Previous and current pain medications/doses/effects:   gabapentin without improvement  Previous Pain Treatments:   PT without improvement  Previous Pain Injections:   n/a  Previous Diagnostic Studies/Images:   MRI LS 8/23   LOCALIZER: No additional findings.  BONES: There is no fracture or bone marrow edema.  ALIGNMENT: There is mild retrolisthesis at L3-L4 and L4-L5. There is straightening of the normal lumbar lordosis.  SACROILIAC JOINTS/SACRUM: There is no sacral fracture. The SI joints are partially visualized but are intact.  CONUS AND CAUDA EQUINA: The distal cord and conus are normal in signal. Conus terminates at L1.  VISUALIZED INTRAPELVIC/INTRA-ABDOMINAL SOFT TISSUES: Normal.  PARASPINAL SOFT TISSUES: Normal.    INDIVIDUAL LEVELS: Diffuse epidural lipomatosis is unchanged.   LOWER THORACIC SPINE: No spinal canal or neuroforaminal stenosis.   L1-L2: Minimal facet arthropathy. No spinal canal or neuroforaminal stenosis.  L2-L3: No spinal canal or neuroforaminal stenosis.  L3-L4: Broad-based posterior disc bulge and mild/moderate bilateral facet arthropathy result in mild bilateral neural foraminal narrowing. Mild central canal narrowing secondary to epidural lipomatosis.  L4-L5: Broad-based posterior disc bulge and moderate bilateral facet arthropathy result in moderate bilateral neural foraminal narrowing and moderate central canal narrowing. There is impingement of the bilateral descending  L5 nerve roots within the right and left lateral recesses.  L5-S1: Broad-based posterior disc osteophyte complex and moderate bilateral facet arthropathy result in moderate bilateral neural foraminal narrowing. There is epidural lipomatosis, unchanged and most pronounced at L5-S1 contributing to severe central canal narrowing..    IMPRESSION:   Multilevel discogenic degenerative disease and facet arthropathy of the lumbar spine, most pronounced at L4-L5 where there is moderate bilateral neural foraminal narrowing and moderate central canal narrowing. Additional varying degrees of central canal and neural foraminal narrowing, as above. These findings are progressed as compared to 6/7/2019. No significant change in superimposed epidural lipomatosis contributing to varying degrees of central canal narrowing   CT Chest/ Abd  LUNGS AND LARGE AIR  WAYS: Increased size of a previously noted right upper lobe paravertebral nodule, measuring 17 mm, compared to 12 mm previously. In addition, increased hazy lung density is seen surrounding this lesion, increased in extent. No other interval change. No new pulmonary nodules. Minimal focal atelectasis seen in the medial and left upper lobe medially. PLEURA: No pleural effusion. VESSELS: Extensive aortic atherosclerotic wall calcification throughout the aorta. No aortic aneurysm. No pulmonary embolism. HEART: Heart size is normal. No pericardial effusion. MEDIASTINUM AND JESSICA: No lymphadenopathy. CHEST WALL AND LOWER NECK: Within normal limits.  ABDOMEN AND PELVIS: LIVER: Nodular contour, compatible with cirrhotic change. This has not significantly changed. No hepatic lesions. The hepatic vessels are patent. BILE DUCTS: Normal caliber. GALLBLADDER: Edema of the gallbladder wall, nonspecific in the presence of mild ascites. SPLEEN: Within normal limits. PANCREAS: Radiopaque lucency noted adjacent to the pancreatic head ADRENALS: Within normal limits. KIDNEYS/URETERS: Symmetric renal enhancement. No collecting system obstruction bilaterally.  BLADDER: Within normal limits. REPRODUCTIVE ORGANS: Prostate within normal limits.  BOWEL: No bowel obstruction. PERITONEUM: Diffuse peritoneal stranding, compatible with mild inflammation/infiltration. Minimal ascites. VESSELS: No abdominal aortic aneurysm or dissection. Extensive infrarenal abdominal aortic atherosclerotic calcification. Severe atherosclerotic calcification noted in the common iliac arteries, internal and external iliac arteries, and common femoral arteries bilaterally. RETROPERITONEUM/LYMPH NODES: No lymphadenopathy. ABDOMINAL WALL: Fat-containing left inguinal hernia BONES: Degenerative changes.   IMPRESSION: 1. Increased size of solitary nodule noted previously in the right upper lobe, compatible with neoplasm. Increased infiltrative change seen in the adjacent lung tissue. 2. No new pulmonary nodules. 3. Cirrhotic changes in liver. 4. Trace ascites. 5. Findings of peritoneal stranding, compatible with mild inflammation/infiltration. 6. No thoracoabdominal aortic dissection or aneurysm. 7. Extensive calcified atherosclerotic disease throughout the arterial tree.    [FreeTextEntry7] : Pain right and left legs and feet .

## 2023-08-30 NOTE — ASSESSMENT
[FreeTextEntry1] : >> Imaging and Other Studies  I personally reviewed the relevant imaging.  Discussed and explained to patient the likely source of pathology and pain.  Questions answered. CT  I personally reviewed the relevant imaging.  Discussed and explained to patient the likely source of pathology and pain.  Questions answered MRI   may consider PT vs intervention pending eval  >> Therapy and Other Modalities  consider restart PT   >> Medications   not candidate for medication at this time  >> Interventions  persistent pain secondary to lumbosacral stenosis demonstrated on imaging refractory to conservative treatments, will schedule caudal epidural steroid injection r/b/a discussed  Patient with epidural lipomatosis - understands that there is a risk of potential exacerbation of symptoms with current treatment  PT prior to intervention  >> Consults  >> Discussion of Risks/Benefits/Alternatives  	>Regarding any scheduled procedures:  I have discussed in detail with the patient that any interventional pain procedure is associated with potential risks.  The procedure may include an injection of steroids and potentially other medications (local anesthetic and normal saline) into the epidural space or surrounding tissue of the spine.  There are significant risks of this procedure which include and are not limited to infection, bleeding, worsening pain, dural puncture leading to postdural puncture headache, nerve damage, spinal cord injury, paralysis, stroke, and death.    There is a chance that the procedure does not improve their pain.    There are risks associated with the steroid being absorbed into the body systemically.  These include dysphoria, difficulty sleeping, mood swings and personality changes.  Premenopausal women may notice an irregularity in her menstrual cycle for 2-3 months following the injection.  Steroids can specifically affect patients with hypertension, diabetes, and peptic ulcers.  The procedure may cause a temporary increase in blood pressure and blood pressure, and may adversely affect a peptic ulcer.  Other, more rare complications, include avascular necrosis of joints, glaucoma and worsening of osteoporosis.   I have discussed the risks of the procedure at length with the patient, and the potential benefits of pain relief.  I have offered alternatives to the procedure.  All questions were answered.    The patient expressed understanding and wishes to proceed with the procedure.  	>Regarding COVID19 Pandemic:   Any planned interventional pain procedure are scheduled because further delay may cause harm or negative outcome to patient.  The goal in performing this procedure is to avoid deterioration of function, emergency room visits (which increases exposure) and reliance on opioids.    r/b/a discussed with patient, lack of evidence to conclusively determine whether pain management procedures have any positive or negative impact on the possibility of amisha the virus and/or development of any sequelae.   Patient counselled regarding timing steroid based intervention 2 weeks before or after COVID-19 vaccine administration to avoid any interaction or affect on efficacy of vaccination  Patient demonstrates understanding  Informed patient that risks associated with the COVID-19 infection.  Informed patient steps taken to limit the risks.  We are implementing safety precautions and following protocols consistent with the CDC and state recommendations. All patients and staff will be checked for fever or signs of illness upon entry to the facility. We will limit our steroid dose to the lowest effective therapeutic dose or in some cases steroids will not be injected at all.   Patient agrees to proceed  >> Conclusion  The above diagnosis and treatment plan is medically reasonable and necessary based on the patient encounter  There were no barriers to communication. Informed patient that I would be available for any additional questions. Patient was instructed to call with any worsening symptoms including severe pain, new numbness/weakness, or changes in the bowel/bladder function.  Discussed role of nsaids in pain management and all relevant risks, if patient is continuing to require after 4 weeks the patient should f/u for alternative treatment.  Instructed patient to maintain pain diary to monitor pain level, mobility, and function.

## 2023-09-01 ENCOUNTER — APPOINTMENT (OUTPATIENT)
Dept: PAIN MANAGEMENT | Facility: CLINIC | Age: 73
End: 2023-09-01
Payer: MEDICARE

## 2023-09-01 VITALS
HEIGHT: 67 IN | SYSTOLIC BLOOD PRESSURE: 159 MMHG | WEIGHT: 158 LBS | DIASTOLIC BLOOD PRESSURE: 71 MMHG | BODY MASS INDEX: 24.8 KG/M2

## 2023-09-01 VITALS
WEIGHT: 158 LBS | HEIGHT: 67 IN | DIASTOLIC BLOOD PRESSURE: 70 MMHG | HEART RATE: 63 BPM | BODY MASS INDEX: 24.8 KG/M2 | SYSTOLIC BLOOD PRESSURE: 170 MMHG | OXYGEN SATURATION: 99 % | TEMPERATURE: 98 F | RESPIRATION RATE: 15 BRPM

## 2023-09-01 PROCEDURE — 62323 NJX INTERLAMINAR LMBR/SAC: CPT

## 2023-09-01 RX ADMIN — Medication %: at 00:00

## 2023-09-01 RX ADMIN — IOHEXOL 0 MG/ML: 180 INJECTION INTRAVENOUS at 00:00

## 2023-09-01 RX ADMIN — TRIAMCINOLONE ACETONIDE 0 MG/ML: 80 INJECTION, SUSPENSION INTRA-ARTICULAR; INTRAMUSCULAR at 00:00

## 2023-09-01 NOTE — PROCEDURE
[FreeTextEntry1] : Caudal Epidural Steroid Injection  The patient was given opportunity to ask questions regarding the procedure, its indications and the associated risks.  The risks of the procedure discussed include but are not limited to infection, bleeding, allergic reactions, nerve injuries, and side effects.  The patient showed understanding and wished to proceed. After obtaining informed consent, the patient's chart was reviewed, the site of operation was marked, and the patient's identification was confirmed.  The patient was brought to the Operating Room and placed in the prone position on the operating room table.  A pillow was placed under the abdomen and hips to reduce the lordotic curve.  Routine monitors were applied.  A surgical timeout was performed.  No skin lesions or signs of cellulitis were noted.  Strict sterile technique was used.  Skin was prepped with Chloroprep solution and draped in sterile manner.  The sacral hiatus was palpated and identified using fluoroscopy in the AP and lateral positions.  Using an entry point in the space identified via fluoroscopy, the skin overlying the sacral hiatus was anesthetized with 1% Lidocaine using a 25-gauge needle into the skin and subcutaneous tissue.  A 22g needle was inserted through the skin wheal, and was advanced under fluoroscopic guidance the sacral membrane.  There were no noted paresthesias, and was negative for heme and CSF.  Position of the needle in the epidural space was confirmed by fluoroscopy in both the AP and Lateral positions.  Omnipaque 180 contrast dye was then injected, and no vascular uptake of dye was seen on live fluoroscopy.  There was no noted pain on injection.  Following this, a mixture of 80mg kenalog, 1 cc lidocaine 1% and 2cc of Normal Saline was injected after negative intermittent heme and CSF aspirations.  There was no pain on injection.  The needle was then flushed with Lidocaine and removed.  A dressing was applied.The patient tolerated the procedure well.  Vital signs remained stable throughout.  Post operative exam did not reveal any new neurological deficits.  Patient was sent to the recovery room in a stable condition.  The patient was asked to follow up in 2 weeks.The patient was instructed to call with fever, chills, increased pain, redness or swelling at the injection site, weakness, numbness or weakness in the leg.
[FreeTextEntry1] : Caudal Epidural Steroid Injection  The patient was given opportunity to ask questions regarding the procedure, its indications and the associated risks.  The risks of the procedure discussed include but are not limited to infection, bleeding, allergic reactions, nerve injuries, and side effects.  The patient showed understanding and wished to proceed. After obtaining informed consent, the patient's chart was reviewed, the site of operation was marked, and the patient's identification was confirmed.  The patient was brought to the Operating Room and placed in the prone position on the operating room table.  A pillow was placed under the abdomen and hips to reduce the lordotic curve.  Routine monitors were applied.  A surgical timeout was performed.  No skin lesions or signs of cellulitis were noted.  Strict sterile technique was used.  Skin was prepped with Chloroprep solution and draped in sterile manner.  The sacral hiatus was palpated and identified using fluoroscopy in the AP and lateral positions.  Using an entry point in the space identified via fluoroscopy, the skin overlying the sacral hiatus was anesthetized with 1% Lidocaine using a 25-gauge needle into the skin and subcutaneous tissue.  A 22g needle was inserted through the skin wheal, and was advanced under fluoroscopic guidance the sacral membrane.  There were no noted paresthesias, and was negative for heme and CSF.  Position of the needle in the epidural space was confirmed by fluoroscopy in both the AP and Lateral positions.  Omnipaque 180 contrast dye was then injected, and no vascular uptake of dye was seen on live fluoroscopy.  There was no noted pain on injection.  Following this, a mixture of 80mg kenalog, 1 cc lidocaine 1% and 2cc of Normal Saline was injected after negative intermittent heme and CSF aspirations.  There was no pain on injection.  The needle was then flushed with Lidocaine and removed.  A dressing was applied.The patient tolerated the procedure well.  Vital signs remained stable throughout.  Post operative exam did not reveal any new neurological deficits.  Patient was sent to the recovery room in a stable condition.  The patient was asked to follow up in 2 weeks.The patient was instructed to call with fever, chills, increased pain, redness or swelling at the injection site, weakness, numbness or weakness in the leg.
no abdominal pain, no bloating, no constipation, no diarrhea, no nausea and no vomiting.

## 2023-09-14 ENCOUNTER — RX RENEWAL (OUTPATIENT)
Age: 73
End: 2023-09-14

## 2023-09-15 ENCOUNTER — APPOINTMENT (OUTPATIENT)
Dept: PAIN MANAGEMENT | Facility: CLINIC | Age: 73
End: 2023-09-15
Payer: MEDICARE

## 2023-09-15 VITALS
HEIGHT: 67 IN | DIASTOLIC BLOOD PRESSURE: 70 MMHG | WEIGHT: 158 LBS | SYSTOLIC BLOOD PRESSURE: 161 MMHG | BODY MASS INDEX: 24.8 KG/M2

## 2023-09-15 PROCEDURE — 99214 OFFICE O/P EST MOD 30 MIN: CPT

## 2023-09-18 ENCOUNTER — RESULT REVIEW (OUTPATIENT)
Age: 73
End: 2023-09-18

## 2023-09-18 ENCOUNTER — APPOINTMENT (OUTPATIENT)
Dept: HEMATOLOGY ONCOLOGY | Facility: CLINIC | Age: 73
End: 2023-09-18
Payer: MEDICARE

## 2023-09-18 VITALS
DIASTOLIC BLOOD PRESSURE: 65 MMHG | WEIGHT: 165 LBS | SYSTOLIC BLOOD PRESSURE: 164 MMHG | HEIGHT: 67 IN | TEMPERATURE: 97.8 F | BODY MASS INDEX: 25.9 KG/M2 | OXYGEN SATURATION: 94 % | HEART RATE: 69 BPM | RESPIRATION RATE: 16 BRPM

## 2023-09-18 DIAGNOSIS — R77.8 OTHER SPECIFIED ABNORMALITIES OF PLASMA PROTEINS: ICD-10-CM

## 2023-09-18 PROCEDURE — 36415 COLL VENOUS BLD VENIPUNCTURE: CPT

## 2023-09-18 PROCEDURE — 99214 OFFICE O/P EST MOD 30 MIN: CPT | Mod: 25

## 2023-09-25 ENCOUNTER — APPOINTMENT (OUTPATIENT)
Dept: GASTROENTEROLOGY | Facility: CLINIC | Age: 73
End: 2023-09-25

## 2023-09-25 ENCOUNTER — RESULT REVIEW (OUTPATIENT)
Age: 73
End: 2023-09-25

## 2023-09-27 DIAGNOSIS — E61.1 IRON DEFICIENCY: ICD-10-CM

## 2023-09-28 ENCOUNTER — APPOINTMENT (OUTPATIENT)
Dept: HEPATOLOGY | Facility: CLINIC | Age: 73
End: 2023-09-28
Payer: MEDICARE

## 2023-09-28 VITALS
HEIGHT: 67 IN | BODY MASS INDEX: 25.9 KG/M2 | SYSTOLIC BLOOD PRESSURE: 120 MMHG | WEIGHT: 165 LBS | DIASTOLIC BLOOD PRESSURE: 70 MMHG

## 2023-09-28 DIAGNOSIS — Z78.9 OTHER SPECIFIED HEALTH STATUS: ICD-10-CM

## 2023-09-28 DIAGNOSIS — Z87.898 PERSONAL HISTORY OF OTHER SPECIFIED CONDITIONS: ICD-10-CM

## 2023-09-28 DIAGNOSIS — G62.9 POLYNEUROPATHY, UNSPECIFIED: ICD-10-CM

## 2023-09-28 DIAGNOSIS — N17.9 ACUTE KIDNEY FAILURE, UNSPECIFIED: ICD-10-CM

## 2023-09-28 DIAGNOSIS — K76.7 HEPATORENAL SYNDROME: ICD-10-CM

## 2023-09-28 DIAGNOSIS — B02.29 OTHER POSTHERPETIC NERVOUS SYSTEM INVOLVEMENT: ICD-10-CM

## 2023-09-28 PROCEDURE — 99215 OFFICE O/P EST HI 40 MIN: CPT

## 2023-10-20 ENCOUNTER — APPOINTMENT (OUTPATIENT)
Dept: PAIN MANAGEMENT | Facility: CLINIC | Age: 73
End: 2023-10-20
Payer: MEDICARE

## 2023-10-20 VITALS
BODY MASS INDEX: 25.11 KG/M2 | SYSTOLIC BLOOD PRESSURE: 130 MMHG | WEIGHT: 160 LBS | DIASTOLIC BLOOD PRESSURE: 60 MMHG | HEIGHT: 67 IN

## 2023-10-20 DIAGNOSIS — B02.29 OTHER POSTHERPETIC NERVOUS SYSTEM INVOLVEMENT: ICD-10-CM

## 2023-10-20 DIAGNOSIS — M79.2 NEURALGIA AND NEURITIS, UNSPECIFIED: ICD-10-CM

## 2023-10-20 PROCEDURE — 99214 OFFICE O/P EST MOD 30 MIN: CPT

## 2023-10-26 ENCOUNTER — APPOINTMENT (OUTPATIENT)
Dept: PAIN MANAGEMENT | Facility: CLINIC | Age: 73
End: 2023-10-26

## 2023-10-26 DIAGNOSIS — M48.062 SPINAL STENOSIS, LUMBAR REGION WITH NEUROGENIC CLAUDICATION: ICD-10-CM

## 2023-10-26 DIAGNOSIS — M54.16 RADICULOPATHY, LUMBAR REGION: ICD-10-CM

## 2023-10-26 DIAGNOSIS — G89.4 CHRONIC PAIN SYNDROME: ICD-10-CM

## 2023-10-30 ENCOUNTER — RESULT REVIEW (OUTPATIENT)
Age: 73
End: 2023-10-30

## 2023-10-30 ENCOUNTER — APPOINTMENT (OUTPATIENT)
Dept: NEUROLOGY | Facility: CLINIC | Age: 73
End: 2023-10-30

## 2023-10-30 ENCOUNTER — APPOINTMENT (OUTPATIENT)
Dept: HEMATOLOGY ONCOLOGY | Facility: CLINIC | Age: 73
End: 2023-10-30

## 2023-10-30 VITALS
DIASTOLIC BLOOD PRESSURE: 69 MMHG | HEART RATE: 59 BPM | SYSTOLIC BLOOD PRESSURE: 143 MMHG | OXYGEN SATURATION: 95 % | TEMPERATURE: 97.8 F | BODY MASS INDEX: 25.27 KG/M2 | HEIGHT: 67 IN | WEIGHT: 161 LBS | RESPIRATION RATE: 16 BRPM

## 2023-11-02 ENCOUNTER — LABORATORY RESULT (OUTPATIENT)
Age: 73
End: 2023-11-02

## 2023-11-07 ENCOUNTER — RESULT REVIEW (OUTPATIENT)
Age: 73
End: 2023-11-07

## 2023-11-08 ENCOUNTER — TRANSCRIPTION ENCOUNTER (OUTPATIENT)
Age: 73
End: 2023-11-08

## 2023-11-08 ENCOUNTER — APPOINTMENT (OUTPATIENT)
Dept: PAIN MANAGEMENT | Facility: CLINIC | Age: 73
End: 2023-11-08

## 2023-11-08 ENCOUNTER — APPOINTMENT (OUTPATIENT)
Dept: GASTROENTEROLOGY | Facility: HOSPITAL | Age: 73
End: 2023-11-08

## 2023-11-08 LAB — HEPATITIS A IGG ANTIBODY: REACTIVE

## 2023-11-09 ENCOUNTER — APPOINTMENT (OUTPATIENT)
Dept: HEPATOLOGY | Facility: CLINIC | Age: 73
End: 2023-11-09
Payer: MEDICARE

## 2023-11-09 VITALS
OXYGEN SATURATION: 95 % | BODY MASS INDEX: 24.17 KG/M2 | HEART RATE: 69 BPM | DIASTOLIC BLOOD PRESSURE: 71 MMHG | SYSTOLIC BLOOD PRESSURE: 143 MMHG | WEIGHT: 154 LBS | HEIGHT: 67 IN

## 2023-11-09 PROCEDURE — 99215 OFFICE O/P EST HI 40 MIN: CPT

## 2023-11-27 ENCOUNTER — RESULT REVIEW (OUTPATIENT)
Age: 73
End: 2023-11-27

## 2023-11-27 ENCOUNTER — APPOINTMENT (OUTPATIENT)
Dept: HEMATOLOGY ONCOLOGY | Facility: CLINIC | Age: 73
End: 2023-11-27
Payer: MEDICARE

## 2023-11-27 VITALS
HEART RATE: 66 BPM | HEIGHT: 67 IN | DIASTOLIC BLOOD PRESSURE: 70 MMHG | WEIGHT: 158.13 LBS | BODY MASS INDEX: 24.82 KG/M2 | SYSTOLIC BLOOD PRESSURE: 152 MMHG | TEMPERATURE: 96.9 F | RESPIRATION RATE: 14 BRPM | OXYGEN SATURATION: 96 %

## 2023-11-27 DIAGNOSIS — E53.8 DEFICIENCY OF OTHER SPECIFIED B GROUP VITAMINS: ICD-10-CM

## 2023-11-27 DIAGNOSIS — R21 RASH AND OTHER NONSPECIFIC SKIN ERUPTION: ICD-10-CM

## 2023-11-27 PROCEDURE — 99214 OFFICE O/P EST MOD 30 MIN: CPT

## 2023-11-27 RX ORDER — CLOBETASOL PROPIONATE 0.5 MG/G
0.05 CREAM TOPICAL TWICE DAILY
Qty: 30 | Refills: 0 | Status: ACTIVE | COMMUNITY
Start: 2023-11-27 | End: 1900-01-01

## 2023-12-11 ENCOUNTER — LABORATORY RESULT (OUTPATIENT)
Age: 73
End: 2023-12-11

## 2023-12-11 ENCOUNTER — RESULT REVIEW (OUTPATIENT)
Age: 73
End: 2023-12-11

## 2023-12-21 ENCOUNTER — APPOINTMENT (OUTPATIENT)
Dept: HEPATOLOGY | Facility: CLINIC | Age: 73
End: 2023-12-21
Payer: MEDICARE

## 2023-12-21 VITALS
SYSTOLIC BLOOD PRESSURE: 120 MMHG | HEIGHT: 67 IN | WEIGHT: 155 LBS | BODY MASS INDEX: 24.33 KG/M2 | DIASTOLIC BLOOD PRESSURE: 60 MMHG

## 2023-12-21 DIAGNOSIS — R10.9 UNSPECIFIED ABDOMINAL PAIN: ICD-10-CM

## 2023-12-21 DIAGNOSIS — G89.29 UNSPECIFIED ABDOMINAL PAIN: ICD-10-CM

## 2023-12-21 PROCEDURE — 99214 OFFICE O/P EST MOD 30 MIN: CPT

## 2023-12-21 NOTE — HISTORY OF PRESENT ILLNESS
[FreeTextEntry1] : - 12/21/23: here with his wife again; had bloodwork and US abdomen (see below). Complains again of L rib pain in area around scar. Meds: Lasix/spironolactone 20/50, bactrim, other. Did not see his pulmonologist about his rib pain, thinks he will get a PET scan every 3 months, i.e. soon.  Exam: 158 lbs - 4 lbs heavier, BMI 24.8. Overweight, abdom. dullness with shift suggestive of small ascites (but none seen on US), no leg edema. Tender L ribs around surgical scar. No asterixis. Labs 12/11/23: Abnormal AFP 9.5, , Na 134, creatinine 1.99 stable, glc 101.   - 11/9/23: returns after starting furosemide/spironolactone 20/50 mg/d, Bactrim. EGD done yesterday by Dr. Winn. He reports that his diffuse abdominal pain has improved - now mild, abdomen not hard anymore, reports 5 lbs weight loss, but he developed new pain around left ribs -  Weight: Exam: abdomen mildly distended, keeps abdom. muscles tense limiting exam - dullness sugg. of mod. ascites. R ribs tender mod and lower chest. No leg edema. No asterixis. Labs 11/2/23: abnormal creatinine 1.78 unchanged, , HbA1c 5.8%, IgG 1667, AFP 9.4 ng/mlL (from 3.5 in May 2022). Normal CBC with , Na, K, other LFTs.  - 9/28/23: Mr. MORALES is a 73 year old man with lung cancer dx 2023 s/p radiation, history of aortic aneurysm s/p aortic arch repair 2021, who is referred by his gastroenterologist, Dr. Winn, because of cirrhosis, which was diagnosed around 2022. He knew about hepatomegaly for many years. He reports abdominal distension and diffuse abdominal pain for 2 years. The pain is very uncomfortable, worse when he sits and walks. No correlation to food intake, no radiation to back or shoulder. Imaging suggested peritonitis, and an exploratory laparotomy on 5/1/23 showed small ascites without malignant cells and negative cultures. A peritoneal biopsy showed chronic inflammation. He takes spironolactone 12.5 mg/day, and omeprazole.  Recent bloodwork showed HCV Ab(+), HCV PCR(-) and past hepatitis B. He was never treated. 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 Numbness and tingling both feet. Also chronic dizziness after getting up, improved after he got iron injections and anemia resolved.   Alcohol history: past heavy alcohol use until 2019, 5-6 cups of liquor daily. None since . SHx: stopped smoking in 2019, smoked for 50 yrs.  Weight history: 165 lbs, BMI 25.8 Remedies/OTC meds:  Workup: - 12/13/23 US abdomen: nodular liver c/w cirrhosis, heterogeneous architecture, no focal hepatic lesion. Normal spleen and GB. Simple R kidney cysst 2.0 cm, mildly increased renal cortical echogenicity suggestive of medical renal disease. - 7/8/23 EGD (Dr. Winn); small EV.  - 7/26/23 CTA chest/abdomen: RUQ lobe lung nodule increased in size, increased infiltrative change in adjacent lung tissue. Cirrhotic liver. Trace ascites. Peritoneal stranding, c/w mild inflammation. Extensive atherosclerosis.  - 7/20/23 US abdomen RUQ: nodular liver c/w cirrhosis. Small ascites. Chronic thickening of GB, GB adenomyomatosis. - 2/28/23 MRI wo contrast: cirrhosis, small ascites. No definitive evidence of acute or chronic pancreatitis. - 1/20/23 MRI pelvis: FDG uptake perineum, favor fibrotic/inflammatory process. Prominent rectal mucosa, correlate for proctitis. - colonoscopy: Dr. Winn.

## 2023-12-21 NOTE — ASSESSMENT
[FreeTextEntry1] : Mr. 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 Numbness and tingling both feet. Also chronic dizziness after getting up, improved after he got iron injections and anemia resolved.  # cirrhosis, diagnosed in 2022 - etiology likely heavy alcohol use until 2019. Past hepatitis B and C may have contributed.  He is immune to hepatitis B due to prior infection and HCB Ab+/HCV RNA-. - low MELD - ascites: trace on CT 7/2023, now none on US 12/11/23. SBP shown by laparoscopy on 5/1/23 and also suggested by imaging. Cx negative, cell counts not done; on Lasix/spiroonolactone 9/2023- recently on 20/50 mg/d, and Bactrim 9/2023-. - CKD3, creatinine 1.73, incrased to ~2.0 mg/dL in Nov 2023. Renal parenchymal disease suggested by US. - EV: small on EGD 11/2023 (Dr. Winn) - HE: none - HCC screening: US 12/2023 negative for lesion. AFP 9 in 11/2023 increased from 3, again 9 in 12/2023.   Both ultrasounds incl. 12/11/23 reviewed - very poor imaging of left hepatic lobe including caudate lobe; essentially not seen.   # new L rib pain mid and lower chest 11/2023, several ribs tender - cause unclear # elevated ALP, mild - DD: cirrhosis, bone disease # orthostatic dizziness  # colon cancer screening: Colonoscopy 2022 negative (Dr. Saucedo)  # frail. Peripheral neuropathy and post-zoster neuralgia L foot, walks with walker.  Plan: - return in 2 months, labs before that - MRI abdomen wwo contrast - continue furosemide 20 mg/d, reduce spironolactone to 25 mg/d; low salt diet - SBP: continue Bactrim - small EV, HTN, orthostatic dizziness: no BB, repeat EGD after 1 year - L rib pain: X ray ribs; f/u with pulmonology.

## 2024-01-17 ENCOUNTER — RX RENEWAL (OUTPATIENT)
Age: 74
End: 2024-01-17

## 2024-02-12 ENCOUNTER — RESULT REVIEW (OUTPATIENT)
Age: 74
End: 2024-02-12

## 2024-02-12 ENCOUNTER — APPOINTMENT (OUTPATIENT)
Dept: HEMATOLOGY ONCOLOGY | Facility: CLINIC | Age: 74
End: 2024-02-12
Payer: MEDICARE

## 2024-02-12 ENCOUNTER — APPOINTMENT (OUTPATIENT)
Dept: HEPATOLOGY | Facility: CLINIC | Age: 74
End: 2024-02-12

## 2024-02-12 VITALS
SYSTOLIC BLOOD PRESSURE: 139 MMHG | TEMPERATURE: 97.7 F | DIASTOLIC BLOOD PRESSURE: 71 MMHG | HEIGHT: 67 IN | WEIGHT: 158 LBS | HEART RATE: 77 BPM | RESPIRATION RATE: 16 BRPM | BODY MASS INDEX: 24.8 KG/M2 | OXYGEN SATURATION: 95 %

## 2024-02-12 PROCEDURE — 99215 OFFICE O/P EST HI 40 MIN: CPT | Mod: 25

## 2024-02-12 PROCEDURE — 36415 COLL VENOUS BLD VENIPUNCTURE: CPT

## 2024-02-13 NOTE — PHYSICAL EXAM
[Ambulatory and capable of all self care but unable to carry out any work activities] : Status 2- Ambulatory and capable of all self care but unable to carry out any work activities. Up and about more than 50% of waking hours [Normal] : affect appropriate [de-identified] : mild abd bloating, mild TTP to L ribs

## 2024-02-13 NOTE — ASSESSMENT
[With Patient/Caregiver] : With Patient/Caregiver [Designated Health Care Proxy] : Designated Health Care Proxy [Name: ___] : Name: [unfilled] [Relationship: ___] : Relationship: [unfilled] [FreeTextEntry1] : Mr. Jeffries is a 72 year old male that presents for follow up for the management of MGUS and lung cancer.  #MGUS - IgG Lambda - M spike unable to quantitate - Anemia due to OLIVE - K:L ratio wnl - PET/CT without bone lesions - No hypercalcemia - CKD noted, no Bence Ledezma proteins - 6/23/23 vs and CBC reviewed; 4.33, hgb 13.7, plt 202. Flow added today. Reviewed myeloma labs from 6/16/23 UPEP without monoclonal band, bence ledezma absent, SPEP mild polyclonal gammopathy, M spike was not reported, K:L 1.28, beta 2 4.5. - 9/18/23 - vs reviewed. told to call cardio about increasing bp med. labs drawn in office today. wbc wnl. plts 112, hgb 13.2. Cr 1.6 - stable, electrolytes wnl, liver wnl. pending spep with [facility] - 11/27/23 - vs reviewed. wbc and plts wnl. hgb 13.9, pending cmp, pending spep with [facility]. Due for PET CT in 1/24 ordered by Dr. Carrillo - 2/12/24 vs and CBC reviewed: WBC 4.99, hgb 12.8, plt 221. Pending repeat myeloma labs today. Reviewed last labs. no band on [facility] serum or urine, K:L 1.39, normal SPEP. Anemia worsening. Did stop iron tabs. recheck irons to determine   #Liver Cirrhosis - Followed by Dr. Winn and Dr. Carroll - 5/1/23 s/p laparoscopy and peritoneal bx pathology revealing fibroadipose tissue with vessels showing mild chronic inflammation. Peritoneal fluid negative for malignancy mesothelial cells and histiocytes present. He was seen in follow up by Dr. Carroll with note of referral back to GI Dr. Winn for management of abd s/s and ascites - Encouraged GI follow up with Dr. Saucedo - 6/23/23 mild abd distension on exam and note of abd bloating. Will add CT A/P has he is due for CT Chest ordered by Dr. Cardoza for management of lung cancer - 2/12/24 continues to follow with hepatology Dr. Martinez. Does not have an apt util 5/2024 now as appt today was cancelled. s/p US and AFP last seen 12/2023 note reviewed. Due for MRI abd. Has appt next week   #L rib pain  - XR in by hepatology and going next week   #CKD - Continue follow up with Nephrology Dr. King  #OLIVE - Was on PO iron with improvement to labs  - Stopped after last visit  - Repeat today as anemia worsening   #Lung Adenocarcinoma - s/p PET/CT 10/2022 as work up for MGUS revealing 13 mm hypermetabolic nodule at the paravertebral posterior right upper lobe, SUV max 5.4, probably representing malignancy. 2.4 cm lenticular pleural-based opacity at the left midlung with mild FDG uptake, indeterminate in nature. Metastatic disease could not be excluded. Focal uptake at the anterior anal canal, SUV max 12.2, possibly representing malignancy. Direct visualization and tissue typing was recommended. Short segments of intense FDG uptake in the wall of the mid and distal descending thoracic aorta consistent with aortitis. Further evaluation with CT angiogram was recommended to evaluate for ulceration. 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 Malignant involvement could not be entirely excluded. - 11/14/22 s/p CT guided right upper lobe lung biopsy and pathology revealed lung tissue showing a small area of fibroelastic scar with a few highly atypical epithelial cells consistent with adenocarcinoma. - Reviewed diagnosis - PDL1 <1% - Reviewed PET/CT - MRI Brain negative - s/p SBRT to the right lung completed on 1/16/23 to 50 Gy - 3/2023 s/p PET/CT revealing no significant change in the hypermetabolic 12 mm (SUV max 4.7, previously 5.4) nodule at the paravertebral posterior right upper lobe consistent with known malignancy. No definite evidence of FDG avid metastatic disease. 2. No significant change in multiple areas of intense FDG uptake within the wall of the thoracic aorta consistent with aortitis. - Continue surveillance imaging per Dr. Carrillo. 9/23 - 1.  Since 3/7/2023, interval decrease in uptake of a 15 mm paravertebral posterior right upper lobe nodule without significant change in size, consistent with a favorable response to treatment. Residual disease cannot be excluded. 2.   Cirrhosis. proceed with Due for PET CT in 1/24 ordered by Dr. Carrillo - 2/12/24 has not seen Dr. Carrillo since 8/2023 advised to make follow up. They will stop a Radiation today. He is due for repeat CT chest 12/2023 which he did not get. Will reach out to rad onc if needs to be CT vs PET. CT order is in from Dr. Carrillo  #Abd Pain/Bloating - As above managed per Dr. Winn and Dr. 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 Will try to obtain scan for us to review. PET didn't mention - s/p laparoscopy and peritoneal bx pathology revealing fibroadipose tissue with vessels showing mild chronic inflammation. Peritoneal fluid negative for malignancy mesothelial cells and histiocytes present. He was seen in follow up by Dr. Carroll with note of referral back to GI Dr. Winn for management of abd s/s and ascites - Followed with Hepatology as above   #Aortitis - Dw Dr. Capone no need for further procedure  #Neuropathic Pain - Patient reports a hx of shingles that affected R leg about 5 years ago still causing some neuropathy - Check B12/folate - Recommend alpha lipoic acid - PCP for hgb A1C - Dr. Boyd for pain  management   RTC in 3 mos with CBC with diff, CMP, LDH, retic, ESR/CRP, myeloma labs and urine.  Case and management discussed with Dr. Herman    [AdvancecareDate] : 11/27/2023 [FreeTextEntry2] : Deferred wishes at this time

## 2024-02-13 NOTE — REVIEW OF SYSTEMS
[Fatigue] : fatigue [Abdominal Pain] : abdominal pain [Dizziness] : dizziness [Negative] : Neurological [Fever] : no fever [Chills] : no chills [Night Sweats] : no night sweats [Recent Change In Weight] : ~T no recent weight change [FreeTextEntry7] : +abd bloating L rib pain

## 2024-02-13 NOTE — HISTORY OF PRESENT ILLNESS
[de-identified] : Mr. Rojas is a 72 year old man who presents for initial consultation of MGUS.\par  He was seen by Dr. Sanders for difficulty walking, foot pain, and nightly spasms.\par  His foot pain started about 6 years ago and he was initially told he had shingles, but he was never treated for shingles.\par  Blood work drawn revealed a weak IgG lambda band, M spike unable to quantitate\par  He has ongoing anemia, but that could be due to his CKD for which he is followed by Dr. King\par  \par  He reports ongoing pain to his right foot which  has forced him to use a wheelchair.\par  He reports ongoing feeling dizziness and lightheadedness\par  He denies nausea, vomiting, constipation, diarrhea, and bleeding\par  \par  Health Maintenance:\par  EGD 8/2022\par  [facility] about 5 years ago\par  Former Smoker x 50 years, 1PPD at max, quit 18 months ago\par  Quit drinking about 18 months ago, 5-6 whiskeys per night\par  \par  3 children alive and well\par  \par  Retired  [de-identified] : Patient seen and examined and here today for follow up for the management of MGUS and lung cancer on surveillance. He has not followed with Dr. Carrillo since 8/2023 at which time she recommended follow up CT chest 12/2023. He has not completed this yet. He has been following with Hepatology Dr. Martinez s/p US and due for MRI abd. He also was noted to have L rib pain and XR was ordered. Continues to have neuropathy s/s to feet not taking anything. Sees pain management Dr. Boyd. He has appts next week for both. Stopped taking PO iron. Denies fevers/chills, HA, dizziness, SOB, cough, CP, palpitations, abd pain, n/v/d, swelling to extremities, back pain, hematuria, BRBPR, abnormal bleeding.

## 2024-02-22 ENCOUNTER — RESULT REVIEW (OUTPATIENT)
Age: 74
End: 2024-02-22

## 2024-02-29 ENCOUNTER — RESULT REVIEW (OUTPATIENT)
Age: 74
End: 2024-02-29

## 2024-02-29 ENCOUNTER — NON-APPOINTMENT (OUTPATIENT)
Age: 74
End: 2024-02-29

## 2024-03-13 ENCOUNTER — NON-APPOINTMENT (OUTPATIENT)
Age: 74
End: 2024-03-13

## 2024-03-14 ENCOUNTER — APPOINTMENT (OUTPATIENT)
Dept: HEPATOLOGY | Facility: CLINIC | Age: 74
End: 2024-03-14
Payer: MEDICARE

## 2024-03-14 VITALS
DIASTOLIC BLOOD PRESSURE: 72 MMHG | HEART RATE: 70 BPM | OXYGEN SATURATION: 98 % | BODY MASS INDEX: 24.33 KG/M2 | SYSTOLIC BLOOD PRESSURE: 138 MMHG | WEIGHT: 155 LBS | HEIGHT: 67 IN

## 2024-03-14 DIAGNOSIS — R07.81 PLEURODYNIA: ICD-10-CM

## 2024-03-14 DIAGNOSIS — I82.890 ACUTE EMBOLISM AND THROMBOSIS OF OTHER SPECIFIED VEINS: ICD-10-CM

## 2024-03-14 PROCEDURE — G2211 COMPLEX E/M VISIT ADD ON: CPT

## 2024-03-14 PROCEDURE — 99215 OFFICE O/P EST HI 40 MIN: CPT

## 2024-03-14 RX ORDER — CHLORHEXIDINE GLUCONATE 4 %
325 (65 FE) LIQUID (ML) TOPICAL DAILY
Qty: 30 | Refills: 2 | Status: DISCONTINUED | COMMUNITY
Start: 2023-09-27 | End: 2024-03-14

## 2024-03-14 RX ORDER — DULOXETINE HYDROCHLORIDE 30 MG/1
30 CAPSULE, DELAYED RELEASE PELLETS ORAL
Qty: 30 | Refills: 3 | Status: DISCONTINUED | COMMUNITY
Start: 2022-09-06 | End: 2024-03-14

## 2024-03-14 NOTE — HISTORY OF PRESENT ILLNESS
[FreeTextEntry1] : - 3/14/24: here after he cancelled an appointment in [place] last month at it is too far away for him, and his wife tried to call many times to reschedule. Some, but not all blood tests done. Rib X-rays and MRI done and his case was reviewed at our tumor board today. Eats sweets and cakes his wife makes. Exam: 155 lbs, BMI 24.3 - abdominal overweight - periumbilical skin fold thickness only ~4 cm, but abdomen protuberant somewhat likely due to mesenteric fat. Tender left ribs laterally around resection site, and also tender left lower ribs. Diffuse, mild upper abdom. tenderness. No fluid wave. Trace ankle edema. No asterixis.  Labs 2/12/24: abnormal: creatinine 1.8, AST 42, . Normal: , rest of LFTs and BMP, INR 1.0.  - 12/21/23: here with his wife again; had bloodwork and US abdomen (see below). Complains again of L rib pain in area around scar. Meds: Lasix/spironolactone 20/50, Bactrim, other. Did not see his pulmonologist about his rib pain, thinks he will get a PET scan every 3 months, i.e. soon.  Exam: 158 lbs - 4 lbs heavier, BMI 24.8. Overweight, abdom. dullness with shift suggestive of small ascites (but none seen on US), no leg edema. Tender L ribs around surgical scar. No asterixis. Labs 12/11/23: Abnormal AFP 9.5, , Na 134, creatinine 1.99 stable, glc 101.   - 11/9/23: returns after starting furosemide/spironolactone 20/50 mg/d, Bactrim. EGD done yesterday by Dr. Winn. He reports that his diffuse abdominal pain has improved - now mild, abdomen not hard anymore, reports 5 lbs weight loss, but he developed new pain around left ribs -  Weight: Exam: abdomen mildly distended, keeps abdom. muscles tense limiting exam - dullness sugg. of mod. ascites. R ribs tender mod and lower chest. No leg edema. No asterixis. Labs 11/2/23: abnormal creatinine 1.78 unchanged, , HbA1c 5.8%, IgG 1667, AFP 9.4 ng/mlL (from 3.5 in May 2022). Normal CBC with , Na, K, other LFTs.  - 9/28/23: Mr. MORALES is a 73 year old man with lung cancer dx 2023 s/p radiation, history of aortic aneurysm s/p aortic arch repair 2021, who is referred by his gastroenterologist, Dr. Winn, because of cirrhosis, which was diagnosed around 2022. He knew about hepatomegaly for many years. He reports abdominal distension and diffuse abdominal pain for 2 years. The pain is very uncomfortable, worse when he sits and walks. No correlation to food intake, no radiation to back or shoulder. Imaging suggested peritonitis, and an exploratory laparotomy on 5/1/23 showed small ascites without malignant cells and negative cultures. A peritoneal biopsy showed chronic inflammation. He takes spironolactone 12.5 mg/day, and omeprazole.  Recent bloodwork showed HCV Ab(+), HCV PCR(-) and past hepatitis B. He was never treated. 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 Numbness and tingling both feet. Also chronic dizziness after getting up, improved after he got iron injections and anemia resolved.   Alcohol history: past heavy alcohol use until 2019, 5-6 cups of liquor daily. None since . SHx: stopped smoking in 2019, smoked for 50 yrs.  Weight history: 165 lbs, BMI 25.8 Remedies/OTC meds: -  Workup: - 2/22/24 MRI abdomen wwo: cirrhosis with markedly shrunken liver and nodular surface. No ascites. LIRADS 4 lesion 1.3 cm segment 2. Patent hepatic vessels. Embolization coild in the GDA. Spleen normal. - 3/7/24 CT chest wo: stable R apical pleural-based lung nodule 1.7 cm, LLL nodule 1 cm - mildly increased. Stable LLL nodule 6 mm. Cirrhosis, loculated R perihepatic ascites, thoracotomy changes L side, atherosclerosis - 12/13/23 US abdomen: nodular liver c/w cirrhosis, heterogeneous architecture, no focal hepatic lesion. Normal spleen and GB. Simple R kidney cysst 2.0 cm, mildly increased renal cortical echogenicity suggestive of medical renal disease. - 7/8/23 EGD (Dr. Winn); small EV.  - 7/26/23 CTA chest/abdomen: RUQ lobe lung nodule increased in size, increased infiltrative change in adjacent lung tissue. Cirrhotic liver. Trace ascites. Peritoneal stranding, c/w mild inflammation. Extensive atherosclerosis.  - 7/20/23 US abdomen RUQ: nodular liver c/w cirrhosis. Small ascites. Chronic thickening of GB, GB adenomyomatosis. - 2/28/23 MRI wo contrast: cirrhosis, small ascites. No definitive evidence of acute or chronic pancreatitis. - 1/20/23 MRI pelvis: FDG uptake perineum, favor fibrotic/inflammatory process. Prominent rectal mucosa, correlate for proctitis. - colonoscopy: Dr. Winn.

## 2024-03-14 NOTE — ASSESSMENT
[FreeTextEntry1] : Mr. 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 Numbness and tingling both feet. Also chronic dizziness after getting up, improved after he got iron injections and anemia resolved.  # cirrhosis, diagnosed in 2022, low MELD - etiology likely heavy alcohol use until 2019. Past hepatitis B and C may have contributed - immune to hepatitis B           due to prior infection and HCB Ab+/HCV RNA-. - ascites with history of SBP 2023: loculated ascites on chest CT 3/2024; none on MRI 2/2024, on low-dose diuretics and Bactrim. Was trace/small in 2023. SBP           shown by laparoscopy on 5/1/23 and also suggested by imaging. Cx negative, cell counts not done; on           Lasix/spironolactone 9/2023- recently on 20/50 mg/d, and Bactrim 9/2023-. - CKD3, creatinine  ~1.7 mg/dL. Renal parenchymal disease suggested by US. - EV: small on EGD 11/2023 (Dr. Winn) - HE: none - HCC screening: LIRADS-4 lesion on MRI 2/2024, 1.3 cm L lobe, found after rise of AFP from 3 to 9 ng/mL in 11/2023. Reviewed at tumor board on 3/14/24 - lesion also seen on prior ultrasound (not described)  # new L rib pain mid and lower chest 11/2023, several ribs tender - due to resection, and likely pressure due to abdominal fat. X-ray ribs and CT chest 2/2024 unrevealing # elevated ALP, mild - DD: cirrhosis, bone disease # orthostatic dizziness  # colon cancer screening: Colonoscopy 2022 negative (Dr. Saucedo)  # frail. Peripheral neuropathy and post-zoster neuralgia L foot, walks with walker.  Plan: - LR-4 lesion: refer to IR, Dr. Liriano for ultrasound-guided biopsy and ablation - continue furosemide 20 mg/d, reduce spironolactone to 25 mg/d; low salt diet - SBP: continue Bactrim - small EV, HTN, orthostatic dizziness: no BB, repeat EGD after 1 year - L rib pain: weight loss, advised to stop eating cakes and sweets

## 2024-03-15 LAB — CANCER AG19-9 SERPL-ACNC: 20 U/ML

## 2024-03-18 ENCOUNTER — RESULT REVIEW (OUTPATIENT)
Age: 74
End: 2024-03-18

## 2024-03-20 LAB
ALPHA-1-FETOPROTEIN-L3: 3.8 %
ALPHA-1-FETOPROTEIN: 11.1 NG/ML

## 2024-03-25 ENCOUNTER — RESULT REVIEW (OUTPATIENT)
Age: 74
End: 2024-03-25

## 2024-03-27 ENCOUNTER — RESULT REVIEW (OUTPATIENT)
Age: 74
End: 2024-03-27

## 2024-03-28 ENCOUNTER — RESULT REVIEW (OUTPATIENT)
Age: 74
End: 2024-03-28

## 2024-04-05 ENCOUNTER — RESULT REVIEW (OUTPATIENT)
Age: 74
End: 2024-04-05

## 2024-05-02 ENCOUNTER — APPOINTMENT (OUTPATIENT)
Dept: HEPATOLOGY | Facility: CLINIC | Age: 74
End: 2024-05-02
Payer: MEDICARE

## 2024-05-02 VITALS
BODY MASS INDEX: 25.43 KG/M2 | HEIGHT: 67 IN | WEIGHT: 162 LBS | HEART RATE: 60 BPM | DIASTOLIC BLOOD PRESSURE: 80 MMHG | SYSTOLIC BLOOD PRESSURE: 120 MMHG | OXYGEN SATURATION: 92 %

## 2024-05-02 DIAGNOSIS — R10.84 GENERALIZED ABDOMINAL PAIN: ICD-10-CM

## 2024-05-02 DIAGNOSIS — R77.2 ABNORMALITY OF ALPHAFETOPROTEIN: ICD-10-CM

## 2024-05-02 DIAGNOSIS — Z86.19 PERSONAL HISTORY OF OTHER INFECTIOUS AND PARASITIC DISEASES: ICD-10-CM

## 2024-05-02 DIAGNOSIS — K59.09 OTHER CONSTIPATION: ICD-10-CM

## 2024-05-02 DIAGNOSIS — E66.3 OVERWEIGHT: ICD-10-CM

## 2024-05-02 DIAGNOSIS — K70.31 ALCOHOLIC CIRRHOSIS OF LIVER WITH ASCITES: ICD-10-CM

## 2024-05-02 PROCEDURE — G2211 COMPLEX E/M VISIT ADD ON: CPT

## 2024-05-02 PROCEDURE — 99214 OFFICE O/P EST MOD 30 MIN: CPT

## 2024-05-02 RX ORDER — SULFAMETHOXAZOLE AND TRIMETHOPRIM 800; 160 MG/1; MG/1
800-160 TABLET ORAL
Qty: 30 | Refills: 3 | Status: ACTIVE | COMMUNITY
Start: 2023-09-28 | End: 1900-01-01

## 2024-05-02 RX ORDER — DIPHENHYDRAMINE HYDROCHLORIDE, ZINC ACETATE 20; 1 MG/G; MG/G
CREAM TOPICAL
Refills: 0 | Status: ACTIVE | COMMUNITY

## 2024-05-02 RX ORDER — SPIRONOLACTONE 25 MG/1
25 TABLET ORAL
Qty: 30 | Refills: 2 | Status: ACTIVE | COMMUNITY
Start: 1900-01-01 | End: 1900-01-01

## 2024-05-06 NOTE — HISTORY OF PRESENT ILLNESS
[FreeTextEntry1] : - 5/2/24: returns after bloodwork and microwave ablation on 3/28 by Dr. Liriano. Feels as before - abdom. bloating and some discomfort, unchanged in many years.  Meds; Lasix/spironolactone 20/25, stool softener, omeprazole Exam: 162 lbs,  BMI 25.4 - gained 7 lbs. Abdominal obesity, rectus diastasis. Mild epig. tenderness, otherwise nontender. Ribs nontender. No rebound-tenderness. Edema 1+ R leg, trace L leg. Dullness suggests possibly small ascites R abdomen. Labs 3/25: LFTs normal with ALT 18, creat 1.5 < 1.8.  normal. Hb 11.6 decresaed since Dec 13.4. INR 1.0. CA 19-9 20 normal. Abnormal AFP 11, normal AFP-L3.  - 3/14/24: here after he cancelled an appointment in [place] last month at it is too far away for him, and his wife tried to call many times to reschedule. Some, but not all blood tests done. Rib X-rays and MRI done and his case was reviewed at our tumor board today. Eats sweets and cakes his wife makes. Exam: 155 lbs, BMI 24.3 - abdominal overweight - periumbilical skin fold thickness only ~4 cm, but abdomen protuberant somewhat likely due to mesenteric fat. Tender left ribs laterally around resection site, and also tender left lower ribs. Diffuse, mild upper abdom. tenderness. No fluid wave. Trace ankle edema. No asterixis.  Labs 2/12/24: abnormal: creatinine 1.8, AST 42, . Normal: , rest of LFTs and BMP, INR 1.0.  - 12/21/23: here with his wife again; had bloodwork and US abdomen (see below). Complains again of L rib pain in area around scar. Meds: Lasix/spironolactone 20/50, Bactrim, other. Did not see his pulmonologist about his rib pain, thinks he will get a PET scan every 3 months, i.e. soon.  Exam: 158 lbs - 4 lbs heavier, BMI 24.8. Overweight, abdom. dullness with shift suggestive of small ascites (but none seen on US), no leg edema. Tender L ribs around surgical scar. No asterixis. Labs 12/11/23: Abnormal AFP 9.5, , Na 134, creatinine 1.99 stable, glc 101.   - 11/9/23: returns after starting furosemide/spironolactone 20/50 mg/d, Bactrim. EGD done yesterday by Dr. Winn. He reports that his diffuse abdominal pain has improved - now mild, abdomen not hard anymore, reports 5 lbs weight loss, but he developed new pain around left ribs -  Weight: Exam: abdomen mildly distended, keeps abdom. muscles tense limiting exam - dullness sugg. of mod. ascites. R ribs tender mod and lower chest. No leg edema. No asterixis. Labs 11/2/23: abnormal creatinine 1.78 unchanged, , HbA1c 5.8%, IgG 1667, AFP 9.4 ng/mlL (from 3.5 in May 2022). Normal CBC with , Na, K, other LFTs.  - 9/28/23: Mr. MORALES is a 73 year old man with lung cancer dx 2023 s/p radiation, history of aortic aneurysm s/p aortic arch repair 2021, who is referred by his gastroenterologist, Dr. Winn, because of cirrhosis, which was diagnosed around 2022. He knew about hepatomegaly for many years. He reports abdominal distension and diffuse abdominal pain for 2 years. The pain is very uncomfortable, worse when he sits and walks. No correlation to food intake, no radiation to back or shoulder. Imaging suggested peritonitis, and an exploratory laparotomy on 5/1/23 showed small ascites without malignant cells and negative cultures. A peritoneal biopsy showed chronic inflammation. He takes spironolactone 12.5 mg/day, and omeprazole.  Recent bloodwork showed HCV Ab(+), HCV PCR(-) and past hepatitis B. He was never treated. 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 Numbness and tingling both feet. Also chronic dizziness after getting up, improved after he got iron injections and anemia resolved.   Alcohol history: past heavy alcohol use until 2019, 5-6 cups of liquor daily. None since . SHx: stopped smoking in 2019, smoked for 50 yrs.  Weight history: 165 lbs, BMI 25.8 Remedies/OTC meds: -  Workup: - 3/28/24 liver bx: tissue lost during processing. Cytology: atypical cells with enlarged nuclei, irreg. nuclear contours, prominent nucleoli and granular cytoplasm - 2/22/24 MRI abdomen wwo: cirrhosis with markedly shrunken liver and nodular surface. No ascites. LIRADS 4 lesion 1.3 cm segment 2. Patent hepatic vessels. Embolization coild in the GDA. Spleen normal. - 3/7/24 CT chest wo: stable R apical pleural-based lung nodule 1.7 cm, LLL nodule 1 cm - mildly increased. Stable LLL nodule 6 mm. Cirrhosis, loculated R perihepatic ascites, thoracotomy changes L side, atherosclerosis - 12/13/23 US abdomen: nodular liver c/w cirrhosis, heterogeneous architecture, no focal hepatic lesion. Normal spleen and GB. Simple R kidney cysst 2.0 cm, mildly increased renal cortical echogenicity suggestive of medical renal disease. - 7/8/23 EGD (Dr. Winn); small EV.  - 7/26/23 CTA chest/abdomen: RUQ lobe lung nodule increased in size, increased infiltrative change in adjacent lung tissue. Cirrhotic liver. Trace ascites. Peritoneal stranding, c/w mild inflammation. Extensive atherosclerosis.  - 7/20/23 US abdomen RUQ: nodular liver c/w cirrhosis. Small ascites. Chronic thickening of GB, GB adenomyomatosis. - 2/28/23 MRI wo contrast: cirrhosis, small ascites. No definitive evidence of acute or chronic pancreatitis. - 1/20/23 MRI pelvis: FDG uptake perineum, favor fibrotic/inflammatory process. Prominent rectal mucosa, correlate for proctitis. - colonoscopy: Dr. Winn.

## 2024-05-06 NOTE — ASSESSMENT
[FreeTextEntry1] : Mr. 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 Numbness and tingling both feet. Also chronic dizziness after getting up, improved after he got iron injections and anemia resolved.  # cirrhosis, diagnosed in 2022, low MELD - etiology likely heavy alcohol use until 2019. Past hepatitis B and C may have contributed - immune to hepatitis B           due to prior infection and HCB Ab+/HCV RNA-. - ascites with history of SBP 2023: loculated ascites on chest CT 3/2024; none on MRI 2/2024, on low-dose diuretics and Bactrim. Was trace/small in 2023. SBP           shown by laparoscopy on 5/1/23 and also suggested by imaging. Cx negative, cell counts not done; on           Lasix/spironolactone 9/2023- recently lowered to 20/525 mg/d, and Bactrim 9/2023-. - CKD3, creatinine improved to 1.3 mg/dL. Renal parenchymal disease suggested by US. - EV: small on EGD 11/2023 (Dr. Winn) - HE: none - HCC screening: LIRADS-4 lesion on MRI 2/2024, 1.3 cm L lobe, found after rise of AFP from 3 to 9 ng/mL in 11/2023. Reviewed at tumor board on 3/14/24 - lesion also seen on prior ultrasound (not described)  # new L rib pain mid and lower chest 11/2023, several ribs tender - due to resection, and likely pressure due to abdominal fat. X-ray ribs and CT chest 2/2024 unrevealing # elevated ALP, mild - DD: cirrhosis, bone disease # orthostatic dizziness  # colon cancer screening: Colonoscopy 2022 negative (Dr. Saucedo)  # frail. Peripheral neuropathy and post-zoster neuralgia L foot, walks with walker.  Plan: - continue furosemide 20 mg/d and spironolactone to 25 mg/d; low salt diet - SBP: continue Bactrim for now given recent MWA - small EV, HTN, orthostatic dizziness: no BB, repeat EGD after 1 year - L rib pain: weight loss, advised to stop eating cakes and sweets - return in 2 months after repeat BW; imging per Dr. Liriano

## 2024-05-15 ENCOUNTER — RESULT REVIEW (OUTPATIENT)
Age: 74
End: 2024-05-15

## 2024-05-15 ENCOUNTER — APPOINTMENT (OUTPATIENT)
Dept: HEMATOLOGY ONCOLOGY | Facility: CLINIC | Age: 74
End: 2024-05-15
Payer: MEDICARE

## 2024-05-15 VITALS
RESPIRATION RATE: 16 BRPM | OXYGEN SATURATION: 96 % | BODY MASS INDEX: 24.86 KG/M2 | TEMPERATURE: 97.2 F | WEIGHT: 158.38 LBS | HEIGHT: 67 IN | HEART RATE: 70 BPM | SYSTOLIC BLOOD PRESSURE: 145 MMHG | DIASTOLIC BLOOD PRESSURE: 65 MMHG

## 2024-05-15 DIAGNOSIS — N18.30 CHRONIC KIDNEY DISEASE, STAGE 3 UNSPECIFIED: ICD-10-CM

## 2024-05-15 DIAGNOSIS — D64.9 ANEMIA, UNSPECIFIED: ICD-10-CM

## 2024-05-15 DIAGNOSIS — D47.2 MONOCLONAL GAMMOPATHY: ICD-10-CM

## 2024-05-15 DIAGNOSIS — C22.9 MALIGNANT NEOPLASM OF LIVER, NOT SPECIFIED AS PRIMARY OR SECONDARY: ICD-10-CM

## 2024-05-15 PROCEDURE — 99215 OFFICE O/P EST HI 40 MIN: CPT | Mod: 25

## 2024-05-15 PROCEDURE — 36415 COLL VENOUS BLD VENIPUNCTURE: CPT

## 2024-05-16 PROBLEM — N18.30 CHRONIC KIDNEY DISEASE, STAGE 3: Status: ACTIVE | Noted: 2022-03-10

## 2024-05-16 PROBLEM — D64.9 ANEMIA: Status: ACTIVE | Noted: 2022-09-28

## 2024-05-16 PROBLEM — D47.2 MGUS (MONOCLONAL GAMMOPATHY OF UNKNOWN SIGNIFICANCE): Status: ACTIVE | Noted: 2022-09-26

## 2024-05-16 PROBLEM — C22.9: Status: ACTIVE | Noted: 2024-05-02

## 2024-05-16 NOTE — ASSESSMENT
[With Patient/Caregiver] : With Patient/Caregiver [Designated Health Care Proxy] : Designated Health Care Proxy [Name: ___] : Name: [unfilled] [Relationship: ___] : Relationship: [unfilled] [FreeTextEntry1] : Mr. Jeffries is a 72 year old male that presents for follow up for the management of MGUS and lung cancer.  #MGUS - IgG Lambda - M spike unable to quantitate - Anemia due to OLIVE - K:L ratio wnl - PET/CT without bone lesions - No hypercalcemia - CKD noted, no Bence Ledezma proteins - 6/23/23 vs and CBC reviewed; 4.33, hgb 13.7, plt 202. Flow added today. Reviewed myeloma labs from 6/16/23 UPEP without monoclonal band, bence ledezma absent, SPEP mild polyclonal gammopathy, M spike was not reported, K:L 1.28, beta 2 4.5. - 9/18/23 - vs reviewed. told to call cardio about increasing bp med. labs drawn in office today. wbc wnl. plts 112, hgb 13.2. Cr 1.6 - stable, electrolytes wnl, liver wnl. pending spep with [facility] - 11/27/23 - vs reviewed. wbc and plts wnl. hgb 13.9, pending cmp, pending spep with [facility]. Due for PET CT in 1/24 ordered by Dr. Carrillo - 2/12/24 vs and CBC reviewed: WBC 4.99, hgb 12.8, plt 221. Pending repeat myeloma labs today. Reviewed last labs. no band on [facility] serum or urine, K:L 1.39, normal SPEP. Anemia worsening. Did stop iron tabs. recheck irons to determine  - 5/15/24 vs and CBC reviewed; WBC 4.24, hgb 10.7, plt 258. Anemia worsening. Irons from last time okay however repeat may need IV iron. MGUS labs from last time. SPEP revealing mild polyclonal gammopathy, K:L 1.25, bence ledezma absent. Repeat today. Plan to restart PO iron and if ferritin borderline or <30 will  offer IV Venofer (has declined in past)   #Liver Cirrhosis - Followed by Dr. Winn and Dr. Carroll - 5/1/23 s/p laparoscopy and peritoneal bx pathology revealing fibroadipose tissue with vessels showing mild chronic inflammation. Peritoneal fluid negative for malignancy mesothelial cells and histiocytes present. He was seen in follow up by Dr. Carroll with note of referral back to GI Dr. Winn for management of abd s/s and ascites - Encouraged GI follow up with Dr. Saucedo - 6/23/23 mild abd distension on exam and note of abd bloating. Will add CT A/P has he is due for CT Chest ordered by Dr. Cardoza for management of lung cancer - 2/12/24 continues to follow with hepatology Dr. Martinez. Does not have an apt util 5/2024 now as appt today was cancelled. s/p US and AFP last seen 12/2023 note reviewed. Due for MRI abd. Has appt next week  - 5/15/24 lung cancer on surveillance. He continues to follow with Dr. Carrillo, last imaging 2/2024 stable with slight increase. He has been following with Hepatology Dr. Martinez s/p US and MRI. s/p bx positive for malignancy 3/2024 and s/p IR ablation with Dr. Liriano. These findings were not communicated to Dr. Herman, myself or Dr. Carrillo. Given hx of lung cancer want to ensure this is not related to lung cancer LIVER LESION, SEGMENT II: - Tissue did not survive during processing. While this is likely liver malignancy and now ablated, he is due repeat CT imaging of chest. Will order PET/CT. MRI abd per Dr. Liriano 6/2024 to follow up ablation.   #Liver Lesion  - Patient with hx of lung cancer s/p RT on surveillance. He continues to follow with Dr. Carrillo, last imaging 2/2024 stable with slight increase. He has been following with Hepatology Dr. Martinez for hx of cirrhosis and s/p US and MRI. s/p bx positive for malignancy 3/2024 and s/p IR ablation with Dr. Liriano. These findings were not communicated to Dr. Herman, myself or Dr. Carrillo. Given hx of lung cancer want to ensure this is not related to lung cancer LIVER LESION, SEGMENT II: Tissue did not survive during processing. Liver lesion, ultrasound guided fine needle aspiration: POSITIVE FOR MALIGNANCY- Atypical epithelial cells with enlarged nuclei, irregular nuclear contours, prominent nucleoli and granular cytoplasm dispersed singly and in clusters; see note. Immunohistochemical stains CK7, CK20, TTF-1, Hepar1 and Arginase, attempted to specify origin of the tumor however cell block cellularity is not efficient for further characterization. While this is likely liver malignancy and now ablated, he is due repeat CT imaging of chest. Will order PET/CT. MRI abd per Dr. Liriano 6/2024 to follow up ablation.   #CKD - Continue follow up with Nephrology Dr. King  #OLIVE - Was on PO iron with improvement to labs  - Anemia worsening as above. Restart PO iron if ferritin borderline or <30 will rec Venofer 200mg IV weekly x 5 (has declined in past)   #Lung Adenocarcinoma - s/p PET/CT 10/2022 as work up for MGUS revealing 13 mm hypermetabolic nodule at the paravertebral posterior right upper lobe, SUV max 5.4, probably representing malignancy. 2.4 cm lenticular pleural-based opacity at the left midlung with mild FDG uptake, indeterminate in nature. Metastatic disease could not be excluded. Focal uptake at the anterior anal canal, SUV max 12.2, possibly representing malignancy. Direct visualization and tissue typing was recommended. Short segments of intense FDG uptake in the wall of the mid and distal descending thoracic aorta consistent with aortitis. Further evaluation with CT angiogram was recommended to evaluate for ulceration. 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 Malignant involvement could not be entirely excluded. - 11/14/22 s/p CT guided right upper lobe lung biopsy and pathology revealed lung tissue showing a small area of fibroelastic scar with a few highly atypical epithelial cells consistent with adenocarcinoma. - Reviewed diagnosis - PDL1 <1% - Reviewed PET/CT - MRI Brain negative - s/p SBRT to the right lung completed on 1/16/23 to 50 Gy - 3/2023 s/p PET/CT revealing no significant change in the hypermetabolic 12 mm (SUV max 4.7, previously 5.4) nodule at the paravertebral posterior right upper lobe consistent with known malignancy. No definite evidence of FDG avid metastatic disease. 2. No significant change in multiple areas of intense FDG uptake within the wall of the thoracic aorta consistent with aortitis. - Continue surveillance imaging per Dr. Carrillo. 9/23 - 1.  Since 3/7/2023, interval decrease in uptake of a 15 mm paravertebral posterior right upper lobe nodule without significant change in size, consistent with a favorable response to treatment. Residual disease cannot be excluded. 2.   Cirrhosis. proceed with Due for PET CT in 1/24 ordered by Dr. Carrillo - 2/12/24 has not seen Dr. Carrillo since 8/2023 advised to make follow up. They will stop a Radiation today. He is due for repeat CT chest 12/2023 which he did not get. Will reach out to rad onc if needs to be CT vs PET. CT order is in from Dr. Carrillo - 5/15/24 Patient with hx of lung cancer s/p RT on surveillance. He continues to follow with Dr. Carrillo, last imaging 2/2024 stable with slight increase. He has been following with Hepatology Dr. Martinez for hx of cirrhosis and s/p US and MRI. s/p bx positive for malignancy 3/2024 and s/p IR ablation with Dr. Liriano. These findings were not communicated to Dr. Herman, myself or Dr. Carrillo. Given hx of lung cancer want to ensure this is not related to lung cancer LIVER LESION, SEGMENT II: Tissue did not survive during processing. Liver lesion, ultrasound guided fine needle aspiration: POSITIVE FOR MALIGNANCY- Atypical epithelial cells with enlarged nuclei, irregular nuclear contours, prominent nucleoli and granular cytoplasm dispersed singly and in clusters; see note. Immunohistochemical stains CK7, CK20, TTF-1, Hepar1 and Arginase, attempted to specify origin of the tumor however cell block cellularity is not efficient for further characterization. While this is likely liver malignancy and now ablated, he is due repeat CT imaging of chest. Will order PET/CT. MRI abd per Dr. Liriano 6/2024 to follow up ablation. Has appt with Dr. Carrillo 5/30/24  #Abd Pain/Bloating - As above managed per Dr. Winn and Dr. 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 Will try to obtain scan for us to review. PET didn't mention - s/p laparoscopy and peritoneal bx pathology revealing fibroadipose tissue with vessels showing mild chronic inflammation. Peritoneal fluid negative for malignancy mesothelial cells and histiocytes present. He was seen in follow up by Dr. Carroll with note of referral back to GI Dr. Winn for management of abd s/s and ascites - Followed with Hepatology as above   #Aortitis - Dw Dr. Capone no need for further procedure  #Neuropathic Pain - Patient reports a hx of shingles that affected R leg about 5 years ago still causing some neuropathy - Check B12/folate - Recommend alpha lipoic acid - PCP for hgb A1C - Dr. Boyd for pain  management   RTC in 2-3 mos with CBC with diff, CMP, LDH, retic, ESR/CRP, myeloma labs and urine.  Case and management discussed with Dr. Herman    [AdvancecareDate] : 11/27/2023 [FreeTextEntry2] : Deferred wishes at this time

## 2024-05-16 NOTE — HISTORY OF PRESENT ILLNESS
-- DO NOT REPLY / DO NOT REPLY ALL --  -- Message is from the [facility]--    COVID-19 Universal Screening: N/A - Not about scheduling    General Patient Message      Reason for Call: The patients daughter is calling she would like to ask about medication for a prescription for \"something to put in her ear\" and the medication was not at the listed pharmacy as told. Also they would like to know what Nephrologist that the doctor would like for the patient to see as well as podiatry and opthalmology  ( the caller did not have HIPPA)    Caller Information       Type Contact Phone    03/18/2021 03:20 PM CDT Phone (Incoming) [place]  [phone]          Alternative phone number: [phone]    Turnaround time given to caller:   \"This message will be sent to [state Provider's name]. The clinical team will fulfill your request as soon as they review your message.\"     [de-identified] : Mr. Rojas is a 72 year old man who presents for initial consultation of MGUS.\par  He was seen by Dr. Sanders for difficulty walking, foot pain, and nightly spasms.\par  His foot pain started about 6 years ago and he was initially told he had shingles, but he was never treated for shingles.\par  Blood work drawn revealed a weak IgG lambda band, M spike unable to quantitate\par  He has ongoing anemia, but that could be due to his CKD for which he is followed by Dr. King\par  \par  He reports ongoing pain to his right foot which  has forced him to use a wheelchair.\par  He reports ongoing feeling dizziness and lightheadedness\par  He denies nausea, vomiting, constipation, diarrhea, and bleeding\par  \par  Health Maintenance:\par  EGD 8/2022\par  [facility] about 5 years ago\par  Former Smoker x 50 years, 1PPD at max, quit 18 months ago\par  Quit drinking about 18 months ago, 5-6 whiskeys per night\par  \par  3 children alive and well\par  \par  Retired  [de-identified] : Patient seen and examined and here today for follow up for the management of MGUS and lung cancer on surveillance. He continues to follow with Dr. Carrillo, last imaging 2/2024 stable with slight increase. He has been following with Hepatology Dr. Martinez s/p US and MRI. s/p bx positive for malignancy and s/p IR ablation with Dr. Liriano. Continues to have neuropathy s/s to feet not taking anything. Sees pain management Dr. Boyd. Stopped taking PO iron. Feels overall well. Denies fevers/chills, HA, dizziness, SOB, cough, CP, palpitations, abd pain, n/v/d, swelling to extremities, back pain, hematuria, BRBPR, abnormal bleeding.

## 2024-05-16 NOTE — PHYSICAL EXAM
[Ambulatory and capable of all self care but unable to carry out any work activities] : Status 2- Ambulatory and capable of all self care but unable to carry out any work activities. Up and about more than 50% of waking hours [Normal] : affect appropriate [de-identified] : mild abd bloating

## 2024-05-16 NOTE — REVIEW OF SYSTEMS
[Fatigue] : fatigue [Abdominal Pain] : abdominal pain [Negative] : Allergic/Immunologic [Fever] : no fever [Chills] : no chills [Night Sweats] : no night sweats [Recent Change In Weight] : ~T no recent weight change [FreeTextEntry7] : +abd bloating

## 2024-05-31 ENCOUNTER — RESULT REVIEW (OUTPATIENT)
Age: 74
End: 2024-05-31

## 2024-06-12 ENCOUNTER — APPOINTMENT (OUTPATIENT)
Dept: RADIATION ONCOLOGY | Facility: CLINIC | Age: 74
End: 2024-06-12
Payer: MEDICARE

## 2024-06-12 VITALS
SYSTOLIC BLOOD PRESSURE: 141 MMHG | OXYGEN SATURATION: 97 % | HEART RATE: 52 BPM | TEMPERATURE: 97.6 F | RESPIRATION RATE: 18 BRPM | DIASTOLIC BLOOD PRESSURE: 63 MMHG

## 2024-06-12 DIAGNOSIS — C34.90 MALIGNANT NEOPLASM OF UNSPECIFIED PART OF UNSPECIFIED BRONCHUS OR LUNG: ICD-10-CM

## 2024-06-12 PROCEDURE — 99214 OFFICE O/P EST MOD 30 MIN: CPT

## 2024-06-12 PROCEDURE — G2211 COMPLEX E/M VISIT ADD ON: CPT

## 2024-06-19 ENCOUNTER — RX RENEWAL (OUTPATIENT)
Age: 74
End: 2024-06-19

## 2024-06-19 RX ORDER — FUROSEMIDE 20 MG/1
20 TABLET ORAL
Qty: 30 | Refills: 2 | Status: ACTIVE | COMMUNITY
Start: 2023-09-28 | End: 1900-01-01

## 2024-06-20 NOTE — HISTORY OF PRESENT ILLNESS
[FreeTextEntry1] : Mr. Rojas is a 73-year-old male previously diagnosed with Stage II T2N0 adenocarcinoma of the right upper lobe.   Mr. Rojas is under the care of Dr. Herman for [facility] and underwent a PET/CT evaluation on 10/26/22 which showed:  1. Hypermetabolic nodule at the paravertebral posterior right upper lobe, SUV max 5.4, probably representing malignancy.  2. 2.4 cm lenticular pleural-based opacity at the left midlung with mild FDG uptake, indeterminate in nature. Metastatic disease could not be excluded.  3. Focal uptake at the anterior anal canal, SUV max 12.2, possibly representing malignancy.  4. Short segments of intense FDG uptake in the wall of the mid and distal descending thoracic aorta consistent with aortitis.  5. 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   11/14/22 CT-guided biopsy: lung tissue showing a small area of fibroelastic scare with a few highly atypical epithelial cells consistent with adenocarcinoma.   Mr. Rojas received SBRT to the right lung in 5 fractions to a total dose of 5000 cGy completed on 1/18/23.   1/18/23 MRI Abdomen: Previously noted FDG uptake corresponded to a small area of enhancement/T2 signal abnormality centered in the perineum as detailed above. Favor chronic fibrotic/inflammatory process given stability dating back to 12/16/16. Prominent rectal mucosa, correlate for prostatitis.  3/15/23 PET: 1. Since 10/26/2022, no significant change in the hypermetabolic 12 mm (SUV max 4.7, previously 5.4) nodule at the paravertebral posterior right upper lobe consistent with known malignancy. No definite evidence of FDG avid metastatic disease.  2. No significant change in multiple areas of intense FDG uptake within the wall of the thoracic aorta consistent with aortitis.   3. Findings consistent with persistent mesenteric panniculitis.  7/26/23 CT C/A/P:   1.  Increased size of solitary nodule noted previously in the right upper lobe, compatible with neoplasm. Increased infiltrative change seen in the adjacent lung tissue.  2.  No new pulmonary nodules.  3.  Cirrhotic changes in liver.  4.  Trace ascites.  5.  Findings of peritoneal stranding, compatible with mild inflammation/infiltration.  6.  No thoracoabdominal aortic dissection or aneurysm.  7.  Extensive calcified atherosclerotic disease throughout the arterial tree.  Mr. Rojas presents today for routine follow-up. He reports he is doing well. He has dizziness on occasion. He is currently receiving iron infusions under the care of Dr. Herman. He denies cough, difficulty swallowing, or SOB. He reports his appetite is fair and weight is stable, with minor weight loss. Mr. Rojas underwent PET SKUL-THI ONC FDG SUBS (5/31/24): Since 9/25/2023, interval decrease in uptake of the 15 x 17 mm paravertebral posterior right upper lobe nodule without significant change in size, SUV max 1.5, previously 2.0.  This residual irregularity appears to be due to post radiation change.   Mr. Rojas demonstrated a rise in AFT from 3to 9 in 11/23, he subsequently underwent an MRI of the abdomen in 2/24 that demonstrated a 1.3cm lesion in the left lobe of the liver.  He was treated with radiofrequency ablation to the liver by Dr. Liriano on 3/28/24, cytology confirmed malignancy but could not specify origin of tumor.  According to the patient and his wife, he tolerated the procedure well.   He has follow-up by Dr. Martinez in July 2024 and Dr. Liriano on 6/28/24. He will undergo MRI Liver at that time. He continues to have bilateral pedal neuropathy and is followed by pain management. Overall, he reports he is doing well.

## 2024-06-28 ENCOUNTER — RESULT REVIEW (OUTPATIENT)
Age: 74
End: 2024-06-28

## 2024-06-28 ENCOUNTER — LABORATORY RESULT (OUTPATIENT)
Age: 74
End: 2024-06-28

## 2024-08-08 ENCOUNTER — APPOINTMENT (OUTPATIENT)
Dept: HEPATOLOGY | Facility: CLINIC | Age: 74
End: 2024-08-08

## 2024-08-08 PROBLEM — K76.9 LESION OF LIVER: Status: ACTIVE | Noted: 2024-08-08

## 2024-08-08 PROBLEM — Z87.19: Status: RESOLVED | Noted: 2021-11-17 | Resolved: 2024-08-08

## 2024-08-08 PROCEDURE — 99214 OFFICE O/P EST MOD 30 MIN: CPT

## 2024-08-08 PROCEDURE — G2211 COMPLEX E/M VISIT ADD ON: CPT

## 2024-08-08 NOTE — HISTORY OF PRESENT ILLNESS
[FreeTextEntry1] : - 8/8/24: returns after bloodwork and MRI. Edema resolved and he stopped the diuretics. Weight 9 lbs lower today. Still has abdom. pain - independent of food intake. Drinks a lot of tea, does not eat white bread, lentils, chickpeas, non-green beans. Exam: 151 lbs, BMI 23.7. Got dizzy when he lay down and got up. Abdomen: mildly distended, dull to percussion, no fluid wave, mild tenderness and mild rebound discomfort. Trace edema. Labs 6/28: Abnormal: , creatinine 1.55 < 1.9. Normal: Hb 14.7, , INR 1.00, tb 0.7, albumin 4.5, AFP 3.8.  - 5/2/24: returns after bloodwork and microwave ablation on 3/28 by Dr. Liriano. Feels as before - abdom. bloating and some discomfort, unchanged in many years.  Meds; Lasix/spironolactone 20/25, stool softener, omeprazole Exam: 162 lbs,  BMI 25.4 - gained 7 lbs. Abdominal obesity, rectus diastasis. Mild epig. tenderness, otherwise nontender. Ribs nontender. No rebound-tenderness. Edema 1+ R leg, trace L leg. Dullness suggests possibly small ascites R abdomen. Labs 3/25: LFTs normal with ALT 18, creat 1.5 < 1.8.  normal. Hb 11.6 decresaed since Dec 13.4. INR 1.0. CA 19-9 20 normal. Abnormal AFP 11, normal AFP-L3.  - 3/14/24: here after he cancelled an appointment in [place] last month at it is too far away for him, and his wife tried to call many times to reschedule. Some, but not all blood tests done. Rib X-rays and MRI done and his case was reviewed at our tumor board today. Eats sweets and cakes his wife makes. Exam: 155 lbs, BMI 24.3 - abdominal overweight - periumbilical skin fold thickness only ~4 cm, but abdomen protuberant somewhat likely due to mesenteric fat. Tender left ribs laterally around resection site, and also tender left lower ribs. Diffuse, mild upper abdom. tenderness. No fluid wave. Trace ankle edema. No asterixis.  Labs 2/12/24: abnormal: creatinine 1.8, AST 42, . Normal: , rest of LFTs and BMP, INR 1.0.  - 12/21/23: here with his wife again; had bloodwork and US abdomen (see below). Complains again of L rib pain in area around scar. Meds: Lasix/spironolactone 20/50, Bactrim, other. Did not see his pulmonologist about his rib pain, thinks he will get a PET scan every 3 months, i.e. soon.  Exam: 158 lbs - 4 lbs heavier, BMI 24.8. Overweight, abdom. dullness with shift suggestive of small ascites (but none seen on US), no leg edema. Tender L ribs around surgical scar. No asterixis. Labs 12/11/23: Abnormal AFP 9.5, , Na 134, creatinine 1.99 stable, glc 101.   - 11/9/23: returns after starting furosemide/spironolactone 20/50 mg/d, Bactrim. EGD done yesterday by Dr. Winn. He reports that his diffuse abdominal pain has improved - now mild, abdomen not hard anymore, reports 5 lbs weight loss, but he developed new pain around left ribs -  Weight: Exam: abdomen mildly distended, keeps abdom. muscles tense limiting exam - dullness sugg. of mod. ascites. R ribs tender mod and lower chest. No leg edema. No asterixis. Labs 11/2/23: abnormal creatinine 1.78 unchanged, , HbA1c 5.8%, IgG 1667, AFP 9.4 ng/mlL (from 3.5 in May 2022). Normal CBC with , Na, K, other LFTs.  - 9/28/23: Mr. MORALES is a 73 year old man with lung cancer dx 2023 s/p radiation, history of aortic aneurysm s/p aortic arch repair 2021, who is referred by his gastroenterologist, Dr. Winn, because of cirrhosis, which was diagnosed around 2022. He knew about hepatomegaly for many years. He reports abdominal distension and diffuse abdominal pain for 2 years. The pain is very uncomfortable, worse when he sits and walks. No correlation to food intake, no radiation to back or shoulder. Imaging suggested peritonitis, and an exploratory laparotomy on 5/1/23 showed small ascites without malignant cells and negative cultures. A peritoneal biopsy showed chronic inflammation. He takes spironolactone 12.5 mg/day, and omeprazole.  Recent bloodwork showed HCV Ab(+), HCV PCR(-) and past hepatitis B. He was never treated. 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 Numbness and tingling both feet. Also chronic dizziness after getting up, improved after he got iron injections and anemia resolved.   Alcohol history: past heavy alcohol use until 2019, 5-6 cups of liquor daily. None since. SHx: stopped smoking in 2019, smoked for 50 yrs.  Weight history: 165 lbs, BMI 25.8 Remedies/OTC meds: -  Workup: - 6/28/24 MRI abdomen: advanced cirrhosis, iron deposition. Ablation cavity segment 2 2.6 cm with smooth peripheral enhancement. Segment 6 hyperenhancing observation 1.1 cm without washout. NO ascites, spleen and GB nl. Severe atherosclerosis aorta.  - 3/28/24: microwave ablation HCC L lobe - 3/28/24 liver bx: tissue lost during processing. Cytology: atypical cells with enlarged nuclei, irreg. nuclear contours, prominent nucleoli and granular cytoplasm - 2/22/24 MRI abdomen wwo: cirrhosis with markedly shrunken liver and nodular surface. No ascites. LIRADS 4 lesion 1.3 cm segment 2. Patent hepatic vessels. Embolization coild in the GDA. Spleen normal. - 3/7/24 CT chest wo: stable R apical pleural-based lung nodule 1.7 cm, LLL nodule 1 cm - mildly increased. Stable LLL nodule 6 mm. Cirrhosis, loculated R perihepatic ascites, thoracotomy changes L side, atherosclerosis - 12/13/23 US abdomen: nodular liver c/w cirrhosis, heterogeneous architecture, no focal hepatic lesion. Normal spleen and GB. Simple R kidney cysst 2.0 cm, mildly increased renal cortical echogenicity suggestive of medical renal disease. - 7/8/23 EGD (Dr. Winn); small EV.  - 7/26/23 CTA chest/abdomen: RUQ lobe lung nodule increased in size, increased infiltrative change in adjacent lung tissue. Cirrhotic liver. Trace ascites. Peritoneal stranding, c/w mild inflammation. Extensive atherosclerosis.  - 7/20/23 US abdomen RUQ: nodular liver c/w cirrhosis. Small ascites. Chronic thickening of GB, GB adenomyomatosis. - 2/28/23 MRI wo contrast: cirrhosis, small ascites. No definitive evidence of acute or chronic pancreatitis. - 1/20/23 MRI pelvis: FDG uptake perineum, favor fibrotic/inflammatory process. Prominent rectal mucosa, correlate for proctitis. - colonoscopy: Dr. Winn.

## 2024-08-08 NOTE — ASSESSMENT
[FreeTextEntry1] : Mr. 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 Numbness and tingling both feet. Also chronic dizziness after getting up, improved after he got iron injections and anemia resolved.  # cirrhosis, diagnosed in 2022, low MELD - etiology likely heavy alcohol use until 2019. Past hepatitis B and C may have contributed - immune to hepatitis B           due to prior infection and HCB Ab+/HCV RNA-. - ascites with history of SBP 2023: loculated ascites on chest CT 3/2024; none on MRI 2/2024, on low-dose diuretics and Bactrim. Was trace/small in 2023. SBP           shown by laparoscopy on 5/1/23 and also suggested by imaging. Cx negative, cell counts not done; on           Lasix/spironolactone 9/2023- recently lowered to 20/525 mg/d, and Bactrim 9/2023-. - CKD3, creatinine improved to 1.3 mg/dL. Renal parenchymal disease suggested by US. - EV: small on EGD 11/2023 (Dr. Winn) - HE: none - HCC screening: LIRADS-4 lesion on MRI 2/2024, 1.3 cm L lobe, found after rise of AFP from 3 to 9 ng/mL in 11/2023.   - microwave ablation segment 2 lesion on 3/28/24 by Dr. Liriano   - MRI 6/28/24: ablation cavity, LIRADS-3 lesion R lobe- hyperenhancing. # chronic diffuse abdominal pain: in past due to SBP, now may be IBS. Eats low FODMAP diet # new L rib pain mid and lower chest 11/2023, several ribs tender - due to resection, and likely pressure due to abdominal fat. X-ray ribs and CT chest 2/2024 unrevealing # elevated ALP, mild - DD: cirrhosis, bone disease # orthostatic dizziness  # colon cancer screening: Colonoscopy 2022 negative (Dr. Saucedo)  # frail. Peripheral neuropathy and post-zoster neuralgia L foot, walks with walker.  Plan: - continue low salt diet - h/o SBP: continue Bactrim for now given abdom. pain, despite resolution of ascites - small EV, HTN, orthostatic dizziness: no BB, repeat EGD after 1 year, in 11/ 2024 - L rib pain: weight loss, advised to stop eating cakes and sweets - return in 2 months after repeat BW; will discuss timing of imaging with Dr. Liriano re: LIRADs 3 lesion

## 2024-08-21 ENCOUNTER — RESULT REVIEW (OUTPATIENT)
Age: 74
End: 2024-08-21

## 2024-08-21 ENCOUNTER — APPOINTMENT (OUTPATIENT)
Dept: HEMATOLOGY ONCOLOGY | Facility: CLINIC | Age: 74
End: 2024-08-21
Payer: MEDICARE

## 2024-08-21 VITALS
WEIGHT: 153 LBS | HEIGHT: 67 IN | TEMPERATURE: 97.6 F | HEART RATE: 52 BPM | OXYGEN SATURATION: 97 % | SYSTOLIC BLOOD PRESSURE: 148 MMHG | BODY MASS INDEX: 24.01 KG/M2 | DIASTOLIC BLOOD PRESSURE: 70 MMHG | RESPIRATION RATE: 16 BRPM

## 2024-08-21 DIAGNOSIS — C22.0 LIVER CELL CARCINOMA: ICD-10-CM

## 2024-08-21 DIAGNOSIS — C34.90 MALIGNANT NEOPLASM OF UNSPECIFIED PART OF UNSPECIFIED BRONCHUS OR LUNG: ICD-10-CM

## 2024-08-21 DIAGNOSIS — D47.2 MONOCLONAL GAMMOPATHY: ICD-10-CM

## 2024-08-21 PROCEDURE — 36415 COLL VENOUS BLD VENIPUNCTURE: CPT

## 2024-08-21 PROCEDURE — 99214 OFFICE O/P EST MOD 30 MIN: CPT

## 2024-08-21 NOTE — PHYSICAL EXAM
[Ambulatory and capable of all self care but unable to carry out any work activities] : Status 2- Ambulatory and capable of all self care but unable to carry out any work activities. Up and about more than 50% of waking hours [Normal] : affect appropriate [de-identified] : mild abd bloating

## 2024-08-21 NOTE — ASSESSMENT
[With Patient/Caregiver] : With Patient/Caregiver [Designated Health Care Proxy] : Designated Health Care Proxy [Name: ___] : Name: [unfilled] [Relationship: ___] : Relationship: [unfilled] [FreeTextEntry1] : Mr. Jeffries is a 72 year old male that presents for follow up for the management of MGUS and lung cancer.  #MGUS - IgG Lambda - M spike unable to quantitate - Anemia due to OLIVE - K:L ratio wnl - PET/CT without bone lesions - No hypercalcemia - CKD noted, no Bence Ledezma proteins - 6/23/23 vs and CBC reviewed; 4.33, hgb 13.7, plt 202. Flow added today. Reviewed myeloma labs from 6/16/23 UPEP without monoclonal band, bence ledezma absent, SPEP mild polyclonal gammopathy, M spike was not reported, K:L 1.28, beta 2 4.5. - 9/18/23 - vs reviewed. told to call cardio about increasing bp med. labs drawn in office today. wbc wnl. plts 112, hgb 13.2. Cr 1.6 - stable, electrolytes wnl, liver wnl. pending spep with SELAM - 11/27/23 - vs reviewed. wbc and plts wnl. hgb 13.9, pending cmp, pending spep with [facility]. Due for PET CT in 1/24 ordered by Dr. Carrillo - 2/12/24 vs and CBC reviewed: WBC 4.99, hgb 12.8, plt 221. Pending repeat myeloma labs today. Reviewed last labs. no band on [facility]EL[facility] serum or urine, K:L 1.39, normal SPEP. Anemia worsening. Did stop iron tabs. recheck irons to determine  - 5/15/24 vs and CBC reviewed; WBC 4.24, hgb 10.7, plt 258. Anemia worsening. Irons from last time okay however repeat may need IV iron. MGUS labs from last time. SPEP revealing mild polyclonal gammopathy, K:L 1.25, bence ledezma absent. Repeat today. Plan to restart PO iron and if ferritin borderline or <30 will  offer IV Venofer (has declined in past)  8/24 - vs and labs reviewed. hgb improved since being given IV iron. pending SPEP with [facility], UPEP with [facility] AND Immunoglobulins  #Liver Cirrhosis - Followed by Dr. Winn and Dr. Carroll - 5/1/23 s/p laparoscopy and peritoneal bx pathology revealing fibroadipose tissue with vessels showing mild chronic inflammation. Peritoneal fluid negative for malignancy mesothelial cells and histiocytes present. He was seen in follow up by Dr. Carroll with note of referral back to GI Dr. Winn for management of abd s/s and ascites - Encouraged GI follow up with Dr. Saucedo - 6/23/23 mild abd distension on exam and note of abd bloating. Will add CT A/P has he is due for CT Chest ordered by Dr. Cardoza for management of lung cancer - 2/12/24 continues to follow with hepatology Dr. Martinez. Does not have an apt util 5/2024 now as appt today was cancelled. s/p US and AFP last seen 12/2023 note reviewed. Due for MRI abd. Has appt next week  - 5/15/24 lung cancer on surveillance. He continues to follow with Dr. Carrillo, last imaging 2/2024 stable with slight increase. He has been following with Hepatology Dr. Martinez s/p US and MRI. s/p bx positive for malignancy 3/2024 and s/p IR ablation with Dr. Liriano. These findings were not communicated to Dr. Herman, myself or Dr. Carrillo. Given hx of lung cancer want to ensure this is not related to lung cancer LIVER LESION, SEGMENT II: - Tissue did not survive during processing. While this is likely liver malignancy and now ablated, he is due repeat CT imaging of chest. Will order PET/CT. MRI abd per Dr. Liriano 6/2024 to follow up ablation.  8/24 - PET CT from 5/24 reviewed.Since 9/25/2023, interval decrease in uptake of the 15 x 17 mm paravertebral posterior right upper lobe nodule without significant change in size, SUV max 1.5, previously 2.0.  This residual irregularity appears to be due to postradiation change. MRI 6/24 - Smooth peripheral enhancement in left hepatic lobe microwave ablation cavity likely representing post procedural change. Equivocal 1.1 cm right hepatic lobe lesion (LI-RADS 3). needs repeat MRI likely in 3-6 months but will dw Dr. Liriano   #Liver Lesion cw malignancy - Patient with hx of lung cancer s/p RT on surveillance. He continues to follow with Dr. Carrillo, last imaging 2/2024 stable with slight increase. He has been following with Hepatology Dr. Martinez for hx of cirrhosis and s/p US and MRI. s/p bx positive for malignancy 3/2024 and s/p IR ablation with Dr. Liriano. These findings were not communicated to Dr. Herman, myself or Dr. Carrillo. Given hx of lung cancer want to ensure this is not related to lung cancer LIVER LESION, SEGMENT II: Tissue did not survive during processing. Liver lesion, ultrasound guided fine needle aspiration: POSITIVE FOR MALIGNANCY- Atypical epithelial cells with enlarged nuclei, irregular nuclear contours, prominent nucleoli and granular cytoplasm dispersed singly and in clusters; see note. Immunohistochemical stains CK7, CK20, TTF-1, Hepar1 and Arginase, attempted to specify origin of the tumor however cell block cellularity is not efficient for further characterization. While this is likely liver malignancy and now ablated, he is due repeat CT imaging of chest. Will order PET/CT. MRI abd per Dr. Liriano 6/2024 to follow up ablation.  PET CT from 5/24 reviewed - stable post RT changes MRI 6/24 - Smooth peripheral enhancement in left hepatic lobe microwave ablation cavity likely representing post procedural change. Equivocal 1.1 cm right hepatic lobe lesion (LI-RADS 3). needs repeat MRI likely in 3-6 months but will dw Dr. Liriano check AFP Follow with Dr. Martinez  #CKD - Continue follow up with Nephrology Dr. King  #OLIVE s/p IV iron   #Lung Adenocarcinoma - s/p PET/CT 10/2022 as work up for MGUS revealing 13 mm hypermetabolic nodule at the paravertebral posterior right upper lobe, SUV max 5.4, probably representing malignancy. 2.4 cm lenticular pleural-based opacity at the left midlung with mild FDG uptake, indeterminate in nature. Metastatic disease could not be excluded. Focal uptake at the anterior anal canal, SUV max 12.2, possibly representing malignancy. Direct visualization and tissue typing was recommended. Short segments of intense FDG uptake in the wall of the mid and distal descending thoracic aorta consistent with aortitis. Further evaluation with CT angiogram was recommended to evaluate for ulceration. 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 Malignant involvement could not be entirely excluded. - 11/14/22 s/p CT guided right upper lobe lung biopsy and pathology revealed lung tissue showing a small area of fibroelastic scar with a few highly atypical epithelial cells consistent with adenocarcinoma. - Reviewed diagnosis - PDL1 <1% - Reviewed PET/CT - MRI Brain negative - s/p SBRT to the right lung completed on 1/16/23 to 50 Gy - 3/2023 s/p PET/CT revealing no significant change in the hypermetabolic 12 mm (SUV max 4.7, previously 5.4) nodule at the paravertebral posterior right upper lobe consistent with known malignancy. No definite evidence of FDG avid metastatic disease. 2. No significant change in multiple areas of intense FDG uptake within the wall of the thoracic aorta consistent with aortitis. - Continue surveillance imaging per Dr. Carrillo. 9/23 - 1.  Since 3/7/2023, interval decrease in uptake of a 15 mm paravertebral posterior right upper lobe nodule without significant change in size, consistent with a favorable response to treatment. Residual disease cannot be excluded. 2.   Cirrhosis. proceed with Due for PET CT in 1/24 ordered by Dr. Carrillo - 2/12/24 has not seen Dr. Carrillo since 8/2023 advised to make follow up. They will stop a Radiation today. He is due for repeat CT chest 12/2023 which he did not get. Will reach out to rad onc if needs to be CT vs PET. CT order is in from Dr. Carrillo - 5/15/24 Patient with hx of lung cancer s/p RT on surveillance. He continues to follow with Dr. Carrillo, last imaging 2/2024 stable with slight increase. He has been following with Hepatology Dr. Martinez for hx of cirrhosis and s/p US and MRI. s/p bx positive for malignancy 3/2024 and s/p IR ablation with Dr. Liriano. These findings were not communicated to Dr. Herman, myself or Dr. Carrillo. Given hx of lung cancer want to ensure this is not related to lung cancer LIVER LESION, SEGMENT II: Tissue did not survive during processing. Liver lesion, ultrasound guided fine needle aspiration: POSITIVE FOR MALIGNANCY- Atypical epithelial cells with enlarged nuclei, irregular nuclear contours, prominent nucleoli and granular cytoplasm dispersed singly and in clusters; see note. Immunohistochemical stains CK7, CK20, TTF-1, Hepar1 and Arginase, attempted to specify origin of the tumor however cell block cellularity is not efficient for further characterization. While this is likely liver malignancy and now ablated, he is due repeat CT imaging of chest. Will order PET/CT. MRI abd per Dr. Liriano 6/2024 to follow up ablation. Has appt with Dr. Carrillo 5/30/24 8/24-  Patient with hx of lung cancer s/p RT on surveillance. He continues to follow with Dr. Carrillo. PET CT from 5/24 reviewed - stable post RT changes  #Abd Pain/Bloating - As above managed per Dr. Winn and Dr. 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 Will try to obtain scan for us to review. PET didn't mention - s/p laparoscopy and peritoneal bx pathology revealing fibroadipose tissue with vessels showing mild chronic inflammation. Peritoneal fluid negative for malignancy mesothelial cells and histiocytes present. He was seen in follow up by Dr. Carroll with note of referral back to GI Dr. Winn for management of abd s/s and ascites - Followed with Hepatology as above   #Aortitis - Dw Dr. Capone no need for further procedure plan for CTA  #Neuropathic Pain - Patient reports a hx of shingles that affected R leg about 5 years ago still causing some neuropathy - Recommend alpha lipoic acid - PCP for hgb A1C - Dr. Boyd for pain  management   RTC in 4-6 mos with CBC with diff, CMP, LDH, retic, ESR/CRP, myeloma labs and urine, AFP, CEA [FreeTextEntry2] : Deferred wishes at this time [AdvancecareDate] : 11/27/2023

## 2024-08-21 NOTE — ASSESSMENT
[With Patient/Caregiver] : With Patient/Caregiver [Designated Health Care Proxy] : Designated Health Care Proxy [Name: ___] : Name: [unfilled] [Relationship: ___] : Relationship: [unfilled] [FreeTextEntry1] : Mr. Jeffries is a 72 year old male that presents for follow up for the management of MGUS and lung cancer.  #MGUS - IgG Lambda - M spike unable to quantitate - Anemia due to OLIVE - K:L ratio wnl - PET/CT without bone lesions - No hypercalcemia - CKD noted, no Bence Ledezma proteins - 6/23/23 vs and CBC reviewed; 4.33, hgb 13.7, plt 202. Flow added today. Reviewed myeloma labs from 6/16/23 UPEP without monoclonal band, bence ledezma absent, SPEP mild polyclonal gammopathy, M spike was not reported, K:L 1.28, beta 2 4.5. - 9/18/23 - vs reviewed. told to call cardio about increasing bp med. labs drawn in office today. wbc wnl. plts 112, hgb 13.2. Cr 1.6 - stable, electrolytes wnl, liver wnl. pending spep with SELAM - 11/27/23 - vs reviewed. wbc and plts wnl. hgb 13.9, pending cmp, pending spep with [facility]. Due for PET CT in 1/24 ordered by Dr. Carrillo - 2/12/24 vs and CBC reviewed: WBC 4.99, hgb 12.8, plt 221. Pending repeat myeloma labs today. Reviewed last labs. no band on SELAM serum or urine, K:L 1.39, normal SPEP. Anemia worsening. Did stop iron tabs. recheck irons to determine  - 5/15/24 vs and CBC reviewed; WBC 4.24, hgb 10.7, plt 258. Anemia worsening. Irons from last time okay however repeat may need IV iron. MGUS labs from last time. SPEP revealing mild polyclonal gammopathy, K:L 1.25, bence ledezma absent. Repeat today. Plan to restart PO iron and if ferritin borderline or <30 will  offer IV Venofer (has declined in past)  8/24 - vs and labs reviewed. hgb improved since being given IV iron. pending SPEP with SEL[facility], UPEP with [facility]EL[facility] AND Immunoglobulins  #Liver Cirrhosis - Followed by Dr. Winn and Dr. Carroll - 5/1/23 s/p laparoscopy and peritoneal bx pathology revealing fibroadipose tissue with vessels showing mild chronic inflammation. Peritoneal fluid negative for malignancy mesothelial cells and histiocytes present. He was seen in follow up by Dr. Carroll with note of referral back to GI Dr. Winn for management of abd s/s and ascites - Encouraged GI follow up with Dr. Saucedo - 6/23/23 mild abd distension on exam and note of abd bloating. Will add CT A/P has he is due for CT Chest ordered by Dr. Cardoza for management of lung cancer - 2/12/24 continues to follow with hepatology Dr. Martinez. Does not have an apt util 5/2024 now as appt today was cancelled. s/p US and AFP last seen 12/2023 note reviewed. Due for MRI abd. Has appt next week  - 5/15/24 lung cancer on surveillance. He continues to follow with Dr. Carrillo, last imaging 2/2024 stable with slight increase. He has been following with Hepatology Dr. Martinez s/p US and MRI. s/p bx positive for malignancy 3/2024 and s/p IR ablation with Dr. Liriano. These findings were not communicated to Dr. Herman, myself or Dr. Carrillo. Given hx of lung cancer want to ensure this is not related to lung cancer LIVER LESION, SEGMENT II: - Tissue did not survive during processing. While this is likely liver malignancy and now ablated, he is due repeat CT imaging of chest. Will order PET/CT. MRI abd per Dr. Liriano 6/2024 to follow up ablation.  8/24 - PET CT from 5/24 reviewed.Since 9/25/2023, interval decrease in uptake of the 15 x 17 mm paravertebral posterior right upper lobe nodule without significant change in size, SUV max 1.5, previously 2.0.  This residual irregularity appears to be due to postradiation change. MRI 6/24 - Smooth peripheral enhancement in left hepatic lobe microwave ablation cavity likely representing post procedural change. Equivocal 1.1 cm right hepatic lobe lesion (LI-RADS 3). needs repeat MRI likely in 3-6 months but will dw Dr. Liriano   #Liver Lesion cw malignancy - Patient with hx of lung cancer s/p RT on surveillance. He continues to follow with Dr. Carrillo, last imaging 2/2024 stable with slight increase. He has been following with Hepatology Dr. Martinez for hx of cirrhosis and s/p US and MRI. s/p bx positive for malignancy 3/2024 and s/p IR ablation with Dr. Liriano. These findings were not communicated to Dr. Herman, myself or Dr. Carrillo. Given hx of lung cancer want to ensure this is not related to lung cancer LIVER LESION, SEGMENT II: Tissue did not survive during processing. Liver lesion, ultrasound guided fine needle aspiration: POSITIVE FOR MALIGNANCY- Atypical epithelial cells with enlarged nuclei, irregular nuclear contours, prominent nucleoli and granular cytoplasm dispersed singly and in clusters; see note. Immunohistochemical stains CK7, CK20, TTF-1, Hepar1 and Arginase, attempted to specify origin of the tumor however cell block cellularity is not efficient for further characterization. While this is likely liver malignancy and now ablated, he is due repeat CT imaging of chest. Will order PET/CT. MRI abd per Dr. Liriano 6/2024 to follow up ablation.  PET CT from 5/24 reviewed - stable post RT changes MRI 6/24 - Smooth peripheral enhancement in left hepatic lobe microwave ablation cavity likely representing post procedural change. Equivocal 1.1 cm right hepatic lobe lesion (LI-RADS 3). needs repeat MRI likely in 3-6 months but will dw Dr. Liriano check AFP Follow with Dr. Martinez  #CKD - Continue follow up with Nephrology Dr. King  #OLIVE s/p IV iron   #Lung Adenocarcinoma - s/p PET/CT 10/2022 as work up for MGUS revealing 13 mm hypermetabolic nodule at the paravertebral posterior right upper lobe, SUV max 5.4, probably representing malignancy. 2.4 cm lenticular pleural-based opacity at the left midlung with mild FDG uptake, indeterminate in nature. Metastatic disease could not be excluded. Focal uptake at the anterior anal canal, SUV max 12.2, possibly representing malignancy. Direct visualization and tissue typing was recommended. Short segments of intense FDG uptake in the wall of the mid and distal descending thoracic aorta consistent with aortitis. Further evaluation with CT angiogram was recommended to evaluate for ulceration. 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 Malignant involvement could not be entirely excluded. - 11/14/22 s/p CT guided right upper lobe lung biopsy and pathology revealed lung tissue showing a small area of fibroelastic scar with a few highly atypical epithelial cells consistent with adenocarcinoma. - Reviewed diagnosis - PDL1 <1% - Reviewed PET/CT - MRI Brain negative - s/p SBRT to the right lung completed on 1/16/23 to 50 Gy - 3/2023 s/p PET/CT revealing no significant change in the hypermetabolic 12 mm (SUV max 4.7, previously 5.4) nodule at the paravertebral posterior right upper lobe consistent with known malignancy. No definite evidence of FDG avid metastatic disease. 2. No significant change in multiple areas of intense FDG uptake within the wall of the thoracic aorta consistent with aortitis. - Continue surveillance imaging per Dr. Carrillo. 9/23 - 1.  Since 3/7/2023, interval decrease in uptake of a 15 mm paravertebral posterior right upper lobe nodule without significant change in size, consistent with a favorable response to treatment. Residual disease cannot be excluded. 2.   Cirrhosis. proceed with Due for PET CT in 1/24 ordered by Dr. Carrillo - 2/12/24 has not seen Dr. Carrillo since 8/2023 advised to make follow up. They will stop a Radiation today. He is due for repeat CT chest 12/2023 which he did not get. Will reach out to rad onc if needs to be CT vs PET. CT order is in from Dr. Carrillo - 5/15/24 Patient with hx of lung cancer s/p RT on surveillance. He continues to follow with Dr. Carrillo, last imaging 2/2024 stable with slight increase. He has been following with Hepatology Dr. Martinez for hx of cirrhosis and s/p US and MRI. s/p bx positive for malignancy 3/2024 and s/p IR ablation with Dr. Liriano. These findings were not communicated to Dr. Herman, myself or Dr. Carrillo. Given hx of lung cancer want to ensure this is not related to lung cancer LIVER LESION, SEGMENT II: Tissue did not survive during processing. Liver lesion, ultrasound guided fine needle aspiration: POSITIVE FOR MALIGNANCY- Atypical epithelial cells with enlarged nuclei, irregular nuclear contours, prominent nucleoli and granular cytoplasm dispersed singly and in clusters; see note. Immunohistochemical stains CK7, CK20, TTF-1, Hepar1 and Arginase, attempted to specify origin of the tumor however cell block cellularity is not efficient for further characterization. While this is likely liver malignancy and now ablated, he is due repeat CT imaging of chest. Will order PET/CT. MRI abd per Dr. Liriano 6/2024 to follow up ablation. Has appt with Dr. Carrillo 5/30/24 8/24-  Patient with hx of lung cancer s/p RT on surveillance. He continues to follow with Dr. Carrillo. PET CT from 5/24 reviewed - stable post RT changes  #Abd Pain/Bloating - As above managed per Dr. Winn and Dr. 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 Will try to obtain scan for us to review. PET didn't mention - s/p laparoscopy and peritoneal bx pathology revealing fibroadipose tissue with vessels showing mild chronic inflammation. Peritoneal fluid negative for malignancy mesothelial cells and histiocytes present. He was seen in follow up by Dr. Carroll with note of referral back to GI Dr. Winn for management of abd s/s and ascites - Followed with Hepatology as above   #Aortitis - Dw Dr. Capone no need for further procedure plan for CTA  #Neuropathic Pain - Patient reports a hx of shingles that affected R leg about 5 years ago still causing some neuropathy - Recommend alpha lipoic acid - PCP for hgb A1C - Dr. Boyd for pain  management   RTC in 4-6 mos with CBC with diff, CMP, LDH, retic, ESR/CRP, myeloma labs and urine, AFP, CEA [AdvancecareDate] : 11/27/2023 [FreeTextEntry2] : Deferred wishes at this time

## 2024-08-21 NOTE — HISTORY OF PRESENT ILLNESS
[de-identified] : Mr. Rojas is a 72 year old man who presents for initial consultation of MGUS.\par  He was seen by Dr. Sanders for difficulty walking, foot pain, and nightly spasms.\par  His foot pain started about 6 years ago and he was initially told he had shingles, but he was never treated for shingles.\par  Blood work drawn revealed a weak IgG lambda band, M spike unable to quantitate\par  He has ongoing anemia, but that could be due to his CKD for which he is followed by Dr. King\par  \par  He reports ongoing pain to his right foot which  has forced him to use a wheelchair.\par  He reports ongoing feeling dizziness and lightheadedness\par  He denies nausea, vomiting, constipation, diarrhea, and bleeding\par  \par  Health Maintenance:\par  EGD 8/2022\par  [facility] about 5 years ago\par  Former Smoker x 50 years, 1PPD at max, quit 18 months ago\par  Quit drinking about 18 months ago, 5-6 whiskeys per night\par  \par  3 children alive and well\par  \par  Retired  [de-identified] : Patient seen and examined and here today for follow up for the management of MGUS and lung cancer on surveillance. He continues to follow with Dr. Carrillo, last imaging 2/2024 stable with slight increase. He has been following with Hepatology Dr. Martinez s/p US and MRI. s/p bx positive for malignancy and s/p IR ablation with Dr. Liriano. Continues to have neuropathy s/s to feet not taking anything. Sees pain management Dr. Boyd. Stopped taking PO iron. Feels overall well. Denies fevers/chills, HA, dizziness, SOB, cough, CP, palpitations, abd pain, n/v/d, swelling to extremities, back pain, hematuria, BRBPR, abnormal bleeding.

## 2024-08-21 NOTE — PHYSICAL EXAM
[Ambulatory and capable of all self care but unable to carry out any work activities] : Status 2- Ambulatory and capable of all self care but unable to carry out any work activities. Up and about more than 50% of waking hours [Normal] : affect appropriate [de-identified] : mild abd bloating

## 2024-08-21 NOTE — HISTORY OF PRESENT ILLNESS
[de-identified] : Mr. Rojas is a 72 year old man who presents for initial consultation of MGUS.\par  He was seen by Dr. Sanders for difficulty walking, foot pain, and nightly spasms.\par  His foot pain started about 6 years ago and he was initially told he had shingles, but he was never treated for shingles.\par  Blood work drawn revealed a weak IgG lambda band, M spike unable to quantitate\par  He has ongoing anemia, but that could be due to his CKD for which he is followed by Dr. King\par  \par  He reports ongoing pain to his right foot which  has forced him to use a wheelchair.\par  He reports ongoing feeling dizziness and lightheadedness\par  He denies nausea, vomiting, constipation, diarrhea, and bleeding\par  \par  Health Maintenance:\par  EGD 8/2022\par  [facility] about 5 years ago\par  Former Smoker x 50 years, 1PPD at max, quit 18 months ago\par  Quit drinking about 18 months ago, 5-6 whiskeys per night\par  \par  3 children alive and well\par  \par  Retired  [de-identified] : Patient seen and examined and here today for follow up for the management of MGUS and lung cancer on surveillance. He continues to follow with Dr. Carrillo, last imaging 2/2024 stable with slight increase. He has been following with Hepatology Dr. Martinez s/p US and MRI. s/p bx positive for malignancy and s/p IR ablation with Dr. Liriano. Continues to have neuropathy s/s to feet not taking anything. Sees pain management Dr. Boyd. Stopped taking PO iron. Feels overall well. Denies fevers/chills, HA, dizziness, SOB, cough, CP, palpitations, abd pain, n/v/d, swelling to extremities, back pain, hematuria, BRBPR, abnormal bleeding.

## 2024-09-12 ENCOUNTER — APPOINTMENT (OUTPATIENT)
Dept: HEPATOLOGY | Facility: CLINIC | Age: 74
End: 2024-09-12

## 2024-12-27 ENCOUNTER — RX RENEWAL (OUTPATIENT)
Age: 74
End: 2024-12-27

## 2025-01-03 ENCOUNTER — RESULT REVIEW (OUTPATIENT)
Age: 75
End: 2025-01-03

## 2025-01-15 ENCOUNTER — APPOINTMENT (OUTPATIENT)
Dept: HEMATOLOGY ONCOLOGY | Facility: CLINIC | Age: 75
End: 2025-01-15

## 2025-02-05 ENCOUNTER — RX RENEWAL (OUTPATIENT)
Age: 75
End: 2025-02-05

## 2025-02-14 ENCOUNTER — NON-APPOINTMENT (OUTPATIENT)
Age: 75
End: 2025-02-14

## 2025-02-14 ENCOUNTER — RESULT REVIEW (OUTPATIENT)
Age: 75
End: 2025-02-14

## 2025-02-27 ENCOUNTER — APPOINTMENT (OUTPATIENT)
Dept: RADIATION ONCOLOGY | Facility: CLINIC | Age: 75
End: 2025-02-27
Payer: MEDICARE

## 2025-02-27 ENCOUNTER — APPOINTMENT (OUTPATIENT)
Dept: HEPATOLOGY | Facility: CLINIC | Age: 75
End: 2025-02-27

## 2025-02-27 DIAGNOSIS — C34.90 MALIGNANT NEOPLASM OF UNSPECIFIED PART OF UNSPECIFIED BRONCHUS OR LUNG: ICD-10-CM

## 2025-02-27 PROCEDURE — 99214 OFFICE O/P EST MOD 30 MIN: CPT

## 2025-02-27 PROCEDURE — G2211 COMPLEX E/M VISIT ADD ON: CPT

## 2025-03-10 ENCOUNTER — APPOINTMENT (OUTPATIENT)
Dept: HEMATOLOGY ONCOLOGY | Facility: CLINIC | Age: 75
End: 2025-03-10
Payer: MEDICARE

## 2025-03-10 ENCOUNTER — RESULT REVIEW (OUTPATIENT)
Age: 75
End: 2025-03-10

## 2025-03-10 VITALS
BODY MASS INDEX: 24.89 KG/M2 | RESPIRATION RATE: 16 BRPM | TEMPERATURE: 98 F | DIASTOLIC BLOOD PRESSURE: 64 MMHG | HEART RATE: 51 BPM | HEIGHT: 67 IN | WEIGHT: 158.56 LBS | OXYGEN SATURATION: 95 % | SYSTOLIC BLOOD PRESSURE: 150 MMHG

## 2025-03-10 DIAGNOSIS — R91.1 SOLITARY PULMONARY NODULE: ICD-10-CM

## 2025-03-10 DIAGNOSIS — D47.2 MONOCLONAL GAMMOPATHY: ICD-10-CM

## 2025-03-10 PROCEDURE — 36415 COLL VENOUS BLD VENIPUNCTURE: CPT

## 2025-03-10 PROCEDURE — 99215 OFFICE O/P EST HI 40 MIN: CPT

## 2025-03-12 ENCOUNTER — APPOINTMENT (OUTPATIENT)
Dept: PAIN MANAGEMENT | Facility: CLINIC | Age: 75
End: 2025-03-12
Payer: MEDICARE

## 2025-03-12 VITALS
SYSTOLIC BLOOD PRESSURE: 153 MMHG | BODY MASS INDEX: 24.8 KG/M2 | DIASTOLIC BLOOD PRESSURE: 76 MMHG | HEIGHT: 67 IN | OXYGEN SATURATION: 94 % | WEIGHT: 158 LBS | HEART RATE: 70 BPM

## 2025-03-12 DIAGNOSIS — M79.18 MYALGIA, OTHER SITE: ICD-10-CM

## 2025-03-12 PROCEDURE — 99214 OFFICE O/P EST MOD 30 MIN: CPT

## 2025-03-12 PROCEDURE — G2211 COMPLEX E/M VISIT ADD ON: CPT

## 2025-03-12 RX ORDER — METHOCARBAMOL 500 MG/1
500 TABLET, FILM COATED ORAL 3 TIMES DAILY
Qty: 45 | Refills: 1 | Status: ACTIVE | COMMUNITY
Start: 2025-03-12 | End: 1900-01-01

## 2025-03-17 ENCOUNTER — APPOINTMENT (OUTPATIENT)
Dept: GERIATRICS | Facility: CLINIC | Age: 75
End: 2025-03-17
Payer: MEDICARE

## 2025-03-17 VITALS
SYSTOLIC BLOOD PRESSURE: 153 MMHG | OXYGEN SATURATION: 94 % | TEMPERATURE: 98 F | RESPIRATION RATE: 16 BRPM | HEART RATE: 58 BPM | BODY MASS INDEX: 24.8 KG/M2 | DIASTOLIC BLOOD PRESSURE: 68 MMHG | HEIGHT: 67 IN | WEIGHT: 158 LBS

## 2025-03-17 DIAGNOSIS — I10 ESSENTIAL (PRIMARY) HYPERTENSION: ICD-10-CM

## 2025-03-17 DIAGNOSIS — R42 DIZZINESS AND GIDDINESS: ICD-10-CM

## 2025-03-17 DIAGNOSIS — G62.9 POLYNEUROPATHY, UNSPECIFIED: ICD-10-CM

## 2025-03-17 DIAGNOSIS — C22.0 LIVER CELL CARCINOMA: ICD-10-CM

## 2025-03-17 DIAGNOSIS — K59.09 OTHER CONSTIPATION: ICD-10-CM

## 2025-03-17 DIAGNOSIS — N18.30 CHRONIC KIDNEY DISEASE, STAGE 3 UNSPECIFIED: ICD-10-CM

## 2025-03-17 DIAGNOSIS — C34.90 MALIGNANT NEOPLASM OF UNSPECIFIED PART OF UNSPECIFIED BRONCHUS OR LUNG: ICD-10-CM

## 2025-03-17 DIAGNOSIS — K70.31 ALCOHOLIC CIRRHOSIS OF LIVER WITH ASCITES: ICD-10-CM

## 2025-03-17 PROCEDURE — 99205 OFFICE O/P NEW HI 60 MIN: CPT

## 2025-03-17 RX ORDER — OXYCODONE 5 MG/1
5 TABLET ORAL EVERY 6 HOURS
Qty: 60 | Refills: 0 | Status: ACTIVE | COMMUNITY
Start: 2025-03-17 | End: 1900-01-01

## 2025-03-19 ENCOUNTER — APPOINTMENT (OUTPATIENT)
Dept: PAIN MANAGEMENT | Facility: CLINIC | Age: 75
End: 2025-03-19

## 2025-03-19 VITALS
DIASTOLIC BLOOD PRESSURE: 70 MMHG | OXYGEN SATURATION: 95 % | HEART RATE: 54 BPM | SYSTOLIC BLOOD PRESSURE: 134 MMHG | WEIGHT: 158 LBS | HEIGHT: 67 IN | BODY MASS INDEX: 24.8 KG/M2

## 2025-03-19 DIAGNOSIS — G89.4 CHRONIC PAIN SYNDROME: ICD-10-CM

## 2025-03-19 DIAGNOSIS — G58.8 OTHER SPECIFIED MONONEUROPATHIES: ICD-10-CM

## 2025-03-19 DIAGNOSIS — B02.29 OTHER POSTHERPETIC NERVOUS SYSTEM INVOLVEMENT: ICD-10-CM

## 2025-03-19 DIAGNOSIS — M48.062 SPINAL STENOSIS, LUMBAR REGION WITH NEUROGENIC CLAUDICATION: ICD-10-CM

## 2025-03-19 DIAGNOSIS — M79.2 NEURALGIA AND NEURITIS, UNSPECIFIED: ICD-10-CM

## 2025-03-19 DIAGNOSIS — M54.16 RADICULOPATHY, LUMBAR REGION: ICD-10-CM

## 2025-03-19 PROCEDURE — 76942 ECHO GUIDE FOR BIOPSY: CPT

## 2025-03-19 PROCEDURE — 64420 NJX AA&/STRD NTRCOST NRV 1: CPT | Mod: 50

## 2025-03-19 PROCEDURE — 99213 OFFICE O/P EST LOW 20 MIN: CPT | Mod: 25

## 2025-03-19 PROCEDURE — 64421 NJX AA&/STRD NTRCOST NRV EA: CPT | Mod: 50

## 2025-03-19 RX ADMIN — TRIAMCINOLONE ACETONIDE 0 MG/ML: 10 INJECTION, SUSPENSION INTRA-ARTICULAR; INTRALESIONAL at 00:00

## 2025-03-19 RX ADMIN — LIDOCAINE HYDROCHLORIDE %: 10 INJECTION, SOLUTION INFILTRATION; PERINEURAL at 00:00

## 2025-03-19 RX ADMIN — Medication 0 %: at 00:00

## 2025-03-25 ENCOUNTER — RX RENEWAL (OUTPATIENT)
Age: 75
End: 2025-03-25

## 2025-04-02 ENCOUNTER — APPOINTMENT (OUTPATIENT)
Dept: PAIN MANAGEMENT | Facility: CLINIC | Age: 75
End: 2025-04-02

## 2025-04-10 ENCOUNTER — APPOINTMENT (OUTPATIENT)
Dept: HEMATOLOGY ONCOLOGY | Facility: CLINIC | Age: 75
End: 2025-04-10

## 2025-06-13 ENCOUNTER — RX RENEWAL (OUTPATIENT)
Age: 75
End: 2025-06-13

## 2025-06-16 ENCOUNTER — RX RENEWAL (OUTPATIENT)
Age: 75
End: 2025-06-16

## 2025-07-09 ENCOUNTER — RESULT REVIEW (OUTPATIENT)
Age: 75
End: 2025-07-09

## 2025-07-09 ENCOUNTER — APPOINTMENT (OUTPATIENT)
Dept: HEMATOLOGY ONCOLOGY | Facility: CLINIC | Age: 75
End: 2025-07-09

## 2025-07-09 VITALS
TEMPERATURE: 97.8 F | OXYGEN SATURATION: 96 % | WEIGHT: 158.44 LBS | HEIGHT: 67 IN | RESPIRATION RATE: 16 BRPM | SYSTOLIC BLOOD PRESSURE: 154 MMHG | BODY MASS INDEX: 24.87 KG/M2 | DIASTOLIC BLOOD PRESSURE: 62 MMHG | HEART RATE: 51 BPM

## 2025-07-09 PROCEDURE — G2211 COMPLEX E/M VISIT ADD ON: CPT

## 2025-07-09 PROCEDURE — 36415 COLL VENOUS BLD VENIPUNCTURE: CPT

## 2025-07-09 PROCEDURE — 99214 OFFICE O/P EST MOD 30 MIN: CPT

## 2025-07-09 RX ORDER — SPIRONOLACTONE 25 MG/1
25 TABLET ORAL
Refills: 0 | Status: ACTIVE | COMMUNITY

## 2025-07-28 ENCOUNTER — RESULT REVIEW (OUTPATIENT)
Age: 75
End: 2025-07-28

## 2025-08-11 ENCOUNTER — RESULT REVIEW (OUTPATIENT)
Age: 75
End: 2025-08-11

## 2025-08-20 ENCOUNTER — RESULT REVIEW (OUTPATIENT)
Age: 75
End: 2025-08-20

## 2025-08-20 ENCOUNTER — APPOINTMENT (OUTPATIENT)
Dept: HEMATOLOGY ONCOLOGY | Facility: CLINIC | Age: 75
End: 2025-08-20
Payer: MEDICARE

## 2025-08-20 VITALS
HEIGHT: 67 IN | OXYGEN SATURATION: 95 % | WEIGHT: 160 LBS | BODY MASS INDEX: 25.11 KG/M2 | DIASTOLIC BLOOD PRESSURE: 66 MMHG | HEART RATE: 50 BPM | SYSTOLIC BLOOD PRESSURE: 159 MMHG | TEMPERATURE: 97 F | RESPIRATION RATE: 16 BRPM

## 2025-08-20 DIAGNOSIS — K70.31 ALCOHOLIC CIRRHOSIS OF LIVER WITH ASCITES: ICD-10-CM

## 2025-08-20 DIAGNOSIS — R59.1 GENERALIZED ENLARGED LYMPH NODES: ICD-10-CM

## 2025-08-20 DIAGNOSIS — C22.0 LIVER CELL CARCINOMA: ICD-10-CM

## 2025-08-20 DIAGNOSIS — R10.9 UNSPECIFIED ABDOMINAL PAIN: ICD-10-CM

## 2025-08-20 DIAGNOSIS — C34.90 MALIGNANT NEOPLASM OF UNSPECIFIED PART OF UNSPECIFIED BRONCHUS OR LUNG: ICD-10-CM

## 2025-08-20 DIAGNOSIS — G89.29 UNSPECIFIED ABDOMINAL PAIN: ICD-10-CM

## 2025-08-20 PROCEDURE — 99215 OFFICE O/P EST HI 40 MIN: CPT

## 2025-08-20 PROCEDURE — G2211 COMPLEX E/M VISIT ADD ON: CPT

## 2025-08-20 PROCEDURE — 36415 COLL VENOUS BLD VENIPUNCTURE: CPT

## 2025-08-20 RX ORDER — OXYCODONE 5 MG/1
5 TABLET ORAL
Qty: 15 | Refills: 0 | Status: ACTIVE | COMMUNITY
Start: 2025-08-20 | End: 1900-01-01

## 2025-08-25 ENCOUNTER — RESULT REVIEW (OUTPATIENT)
Age: 75
End: 2025-08-25

## 2025-09-04 ENCOUNTER — APPOINTMENT (OUTPATIENT)
Dept: HEMATOLOGY ONCOLOGY | Facility: CLINIC | Age: 75
End: 2025-09-04
Payer: MEDICARE

## 2025-09-04 VITALS
HEART RATE: 55 BPM | HEIGHT: 67 IN | WEIGHT: 158.56 LBS | TEMPERATURE: 97.8 F | DIASTOLIC BLOOD PRESSURE: 68 MMHG | OXYGEN SATURATION: 94 % | RESPIRATION RATE: 16 BRPM | SYSTOLIC BLOOD PRESSURE: 154 MMHG | BODY MASS INDEX: 24.89 KG/M2

## 2025-09-04 DIAGNOSIS — C34.90 MALIGNANT NEOPLASM OF UNSPECIFIED PART OF UNSPECIFIED BRONCHUS OR LUNG: ICD-10-CM

## 2025-09-04 DIAGNOSIS — I10 ESSENTIAL (PRIMARY) HYPERTENSION: ICD-10-CM

## 2025-09-04 PROCEDURE — 99215 OFFICE O/P EST HI 40 MIN: CPT

## 2025-09-04 PROCEDURE — G2211 COMPLEX E/M VISIT ADD ON: CPT

## 2025-09-04 PROCEDURE — 36415 COLL VENOUS BLD VENIPUNCTURE: CPT

## 2025-09-08 ENCOUNTER — APPOINTMENT (OUTPATIENT)
Dept: RADIATION ONCOLOGY | Facility: CLINIC | Age: 75
End: 2025-09-08
Payer: MEDICARE

## 2025-09-08 VITALS
BODY MASS INDEX: 25.11 KG/M2 | HEART RATE: 68 BPM | TEMPERATURE: 98.1 F | SYSTOLIC BLOOD PRESSURE: 146 MMHG | RESPIRATION RATE: 16 BRPM | HEIGHT: 67 IN | OXYGEN SATURATION: 97 % | DIASTOLIC BLOOD PRESSURE: 66 MMHG | WEIGHT: 160 LBS

## 2025-09-08 PROCEDURE — 99214 OFFICE O/P EST MOD 30 MIN: CPT

## 2025-09-09 ENCOUNTER — APPOINTMENT (OUTPATIENT)
Dept: THORACIC SURGERY | Facility: CLINIC | Age: 75
End: 2025-09-09
Payer: MEDICARE

## 2025-09-09 VITALS
OXYGEN SATURATION: 96 % | DIASTOLIC BLOOD PRESSURE: 72 MMHG | BODY MASS INDEX: 25.11 KG/M2 | WEIGHT: 160 LBS | RESPIRATION RATE: 14 BRPM | SYSTOLIC BLOOD PRESSURE: 162 MMHG | HEART RATE: 72 BPM | HEIGHT: 67 IN

## 2025-09-09 PROCEDURE — 99215 OFFICE O/P EST HI 40 MIN: CPT

## 2025-09-16 ENCOUNTER — NON-APPOINTMENT (OUTPATIENT)
Age: 75
End: 2025-09-16

## 2025-09-19 ENCOUNTER — APPOINTMENT (OUTPATIENT)
Dept: HEMATOLOGY ONCOLOGY | Facility: CLINIC | Age: 75
End: 2025-09-19

## 2025-09-19 ENCOUNTER — RESULT REVIEW (OUTPATIENT)
Age: 75
End: 2025-09-19